# Patient Record
Sex: FEMALE | URBAN - METROPOLITAN AREA
[De-identification: names, ages, dates, MRNs, and addresses within clinical notes are randomized per-mention and may not be internally consistent; named-entity substitution may affect disease eponyms.]

---

## 2017-05-16 ENCOUNTER — APPOINTMENT (RX ONLY)
Dept: URBAN - METROPOLITAN AREA CLINIC 139 | Facility: CLINIC | Age: 37
Setting detail: DERMATOLOGY
End: 2017-05-16

## 2017-05-16 DIAGNOSIS — B36.0 PITYRIASIS VERSICOLOR: ICD-10-CM

## 2017-05-16 DIAGNOSIS — Z80.8 FAMILY HISTORY OF MALIGNANT NEOPLASM OF OTHER ORGANS OR SYSTEMS: ICD-10-CM

## 2017-05-16 PROCEDURE — ? KOH PREP

## 2017-05-16 PROCEDURE — 99202 OFFICE O/P NEW SF 15 MIN: CPT

## 2017-05-16 PROCEDURE — ? PRESCRIPTION

## 2017-05-16 PROCEDURE — ? COUNSELING

## 2017-05-16 RX ORDER — KETOCONAZOLE 20.5 MG/ML
SHAMPOO, SUSPENSION TOPICAL
Qty: 1 | Refills: 5 | Status: ERX | COMMUNITY
Start: 2017-05-16

## 2017-05-16 RX ORDER — FLUCONAZOLE 200 MG/1
TABLET ORAL
Qty: 3 | Refills: 0 | Status: ERX | COMMUNITY
Start: 2017-05-16

## 2017-05-16 RX ADMIN — KETOCONAZOLE: 20.5 SHAMPOO, SUSPENSION TOPICAL at 17:23

## 2017-05-16 RX ADMIN — FLUCONAZOLE: 200 TABLET ORAL at 17:15

## 2017-05-16 ASSESSMENT — LOCATION DETAILED DESCRIPTION DERM: LOCATION DETAILED: RIGHT SUPERIOR MEDIAL MIDBACK

## 2017-05-16 ASSESSMENT — LOCATION ZONE DERM: LOCATION ZONE: TRUNK

## 2017-05-16 ASSESSMENT — LOCATION SIMPLE DESCRIPTION DERM: LOCATION SIMPLE: RIGHT LOWER BACK

## 2017-05-16 NOTE — PROCEDURE: KOH PREP
Add  To Bill: Yes
Detail Level: Zone
Showing: branching hyphae
Koh Intro Text (From The.....): A KOH prep was ordered and evaluated from the

## 2017-05-16 NOTE — HPI: SKIN CHECK
What Is The Reason For Today's Visit?: Excessive sun exposure
Additional History: Patient does not present chief complaints today.

## 2018-03-19 ENCOUNTER — OFFICE VISIT CONVERTED (OUTPATIENT)
Dept: FAMILY MEDICINE CLINIC | Facility: CLINIC | Age: 38
End: 2018-03-19
Attending: NURSE PRACTITIONER

## 2018-03-27 ENCOUNTER — OFFICE VISIT CONVERTED (OUTPATIENT)
Dept: ORTHOPEDIC SURGERY | Facility: CLINIC | Age: 38
End: 2018-03-27
Attending: ORTHOPAEDIC SURGERY

## 2018-04-20 ENCOUNTER — CONVERSION ENCOUNTER (OUTPATIENT)
Dept: ORTHOPEDIC SURGERY | Facility: CLINIC | Age: 38
End: 2018-04-20

## 2018-04-20 ENCOUNTER — OFFICE VISIT CONVERTED (OUTPATIENT)
Dept: ORTHOPEDIC SURGERY | Facility: CLINIC | Age: 38
End: 2018-04-20
Attending: PHYSICIAN ASSISTANT

## 2018-04-26 ENCOUNTER — OFFICE VISIT CONVERTED (OUTPATIENT)
Dept: ORTHOPEDIC SURGERY | Facility: CLINIC | Age: 38
End: 2018-04-26
Attending: PHYSICIAN ASSISTANT

## 2018-04-26 ENCOUNTER — CONVERSION ENCOUNTER (OUTPATIENT)
Dept: ORTHOPEDIC SURGERY | Facility: CLINIC | Age: 38
End: 2018-04-26

## 2018-05-01 ENCOUNTER — OFFICE VISIT CONVERTED (OUTPATIENT)
Dept: ORTHOPEDIC SURGERY | Facility: CLINIC | Age: 38
End: 2018-05-01
Attending: PHYSICIAN ASSISTANT

## 2018-09-10 ENCOUNTER — OFFICE VISIT CONVERTED (OUTPATIENT)
Dept: FAMILY MEDICINE CLINIC | Facility: CLINIC | Age: 38
End: 2018-09-10
Attending: NURSE PRACTITIONER

## 2018-09-10 ENCOUNTER — CONVERSION ENCOUNTER (OUTPATIENT)
Dept: FAMILY MEDICINE CLINIC | Facility: CLINIC | Age: 38
End: 2018-09-10

## 2018-11-01 ENCOUNTER — OFFICE VISIT CONVERTED (OUTPATIENT)
Dept: FAMILY MEDICINE CLINIC | Facility: CLINIC | Age: 38
End: 2018-11-01
Attending: NURSE PRACTITIONER

## 2019-07-08 ENCOUNTER — CONVERSION ENCOUNTER (OUTPATIENT)
Dept: FAMILY MEDICINE CLINIC | Facility: CLINIC | Age: 39
End: 2019-07-08

## 2019-07-08 ENCOUNTER — OFFICE VISIT CONVERTED (OUTPATIENT)
Dept: FAMILY MEDICINE CLINIC | Facility: CLINIC | Age: 39
End: 2019-07-08
Attending: FAMILY MEDICINE

## 2019-07-24 ENCOUNTER — HOSPITAL ENCOUNTER (OUTPATIENT)
Dept: FAMILY MEDICINE CLINIC | Facility: CLINIC | Age: 39
Discharge: HOME | End: 2019-07-24
Attending: NURSE PRACTITIONER

## 2019-07-24 ENCOUNTER — OFFICE VISIT CONVERTED (OUTPATIENT)
Dept: FAMILY MEDICINE CLINIC | Facility: CLINIC | Age: 39
End: 2019-07-24
Attending: NURSE PRACTITIONER

## 2019-07-29 LAB
CONV LAST MENSTURAL PERIOD: NORMAL
SPECIMEN SOURCE: NORMAL
SPECIMEN SOURCE: NORMAL
THIN PREP CVX: NORMAL

## 2019-08-27 ENCOUNTER — HOSPITAL ENCOUNTER (OUTPATIENT)
Dept: URGENT CARE | Facility: CLINIC | Age: 39
Discharge: HOME | End: 2019-08-27

## 2019-09-10 ENCOUNTER — OFFICE VISIT CONVERTED (OUTPATIENT)
Dept: FAMILY MEDICINE CLINIC | Facility: CLINIC | Age: 39
End: 2019-09-10
Attending: NURSE PRACTITIONER

## 2020-01-27 ENCOUNTER — HOSPITAL ENCOUNTER (OUTPATIENT)
Dept: OTHER | Facility: HOSPITAL | Age: 40
Discharge: HOME | End: 2020-01-27
Attending: NURSE PRACTITIONER

## 2020-01-27 LAB
ALBUMIN SERPL-MCNC: 4.2 G/DL (ref 3.5–5)
ALBUMIN/GLOB SERPL: 1.3 {RATIO} (ref 1.4–2.6)
ALP SERPL-CCNC: 59 U/L (ref 42–98)
ALT SERPL-CCNC: 17 U/L (ref 10–40)
ANION GAP SERPL CALC-SCNC: 18 MMOL/L (ref 8–19)
AST SERPL-CCNC: 19 U/L (ref 15–50)
BASOPHILS # BLD AUTO: 0.06 10*3/UL (ref 0–0.2)
BASOPHILS NFR BLD AUTO: 0.9 % (ref 0–3)
BILIRUB SERPL-MCNC: 0.39 MG/DL (ref 0.2–1.3)
BUN SERPL-MCNC: 15 MG/DL (ref 5–25)
BUN/CREAT SERPL: 17 {RATIO} (ref 6–20)
CALCIUM SERPL-MCNC: 8.9 MG/DL (ref 8.7–10.4)
CHLORIDE SERPL-SCNC: 103 MMOL/L (ref 99–111)
CHOLEST SERPL-MCNC: 163 MG/DL (ref 107–200)
CHOLEST/HDLC SERPL: 2.4 {RATIO} (ref 3–6)
CONV ABS IMM GRAN: 0.01 10*3/UL (ref 0–0.2)
CONV CO2: 23 MMOL/L (ref 22–32)
CONV IMMATURE GRAN: 0.2 % (ref 0–1.8)
CONV TOTAL PROTEIN: 7.4 G/DL (ref 6.3–8.2)
CREAT UR-MCNC: 0.89 MG/DL (ref 0.5–0.9)
DEPRECATED RDW RBC AUTO: 43.4 FL (ref 36.4–46.3)
EOSINOPHIL # BLD AUTO: 0.15 10*3/UL (ref 0–0.7)
EOSINOPHIL # BLD AUTO: 2.3 % (ref 0–7)
ERYTHROCYTE [DISTWIDTH] IN BLOOD BY AUTOMATED COUNT: 12.8 % (ref 11.7–14.4)
GFR SERPLBLD BASED ON 1.73 SQ M-ARVRAT: >60 ML/MIN/{1.73_M2}
GLOBULIN UR ELPH-MCNC: 3.2 G/DL (ref 2–3.5)
GLUCOSE SERPL-MCNC: 82 MG/DL (ref 65–99)
HCT VFR BLD AUTO: 39.6 % (ref 37–47)
HDLC SERPL-MCNC: 68 MG/DL (ref 40–60)
HGB BLD-MCNC: 12.9 G/DL (ref 12–16)
LDLC SERPL CALC-MCNC: 87 MG/DL (ref 70–100)
LYMPHOCYTES # BLD AUTO: 2.92 10*3/UL (ref 1–5)
LYMPHOCYTES NFR BLD AUTO: 44.9 % (ref 20–45)
MCH RBC QN AUTO: 30.3 PG (ref 27–31)
MCHC RBC AUTO-ENTMCNC: 32.6 G/DL (ref 33–37)
MCV RBC AUTO: 93 FL (ref 81–99)
MONOCYTES # BLD AUTO: 0.51 10*3/UL (ref 0.2–1.2)
MONOCYTES NFR BLD AUTO: 7.8 % (ref 3–10)
NEUTROPHILS # BLD AUTO: 2.86 10*3/UL (ref 2–8)
NEUTROPHILS NFR BLD AUTO: 43.9 % (ref 30–85)
NRBC CBCN: 0 % (ref 0–0.7)
OSMOLALITY SERPL CALC.SUM OF ELEC: 288 MOSM/KG (ref 273–304)
PLATELET # BLD AUTO: 259 10*3/UL (ref 130–400)
PMV BLD AUTO: 11 FL (ref 9.4–12.3)
POTASSIUM SERPL-SCNC: 4.5 MMOL/L (ref 3.5–5.3)
RBC # BLD AUTO: 4.26 10*6/UL (ref 4.2–5.4)
SODIUM SERPL-SCNC: 139 MMOL/L (ref 135–147)
T4 FREE SERPL-MCNC: 1.3 NG/DL (ref 0.9–1.8)
TRIGL SERPL-MCNC: 41 MG/DL (ref 40–150)
TSH SERPL-ACNC: 2.85 M[IU]/L (ref 0.27–4.2)
VLDLC SERPL-MCNC: 8 MG/DL (ref 5–37)
WBC # BLD AUTO: 6.51 10*3/UL (ref 4.8–10.8)

## 2020-02-24 ENCOUNTER — OFFICE VISIT CONVERTED (OUTPATIENT)
Dept: FAMILY MEDICINE CLINIC | Facility: CLINIC | Age: 40
End: 2020-02-24
Attending: NURSE PRACTITIONER

## 2020-02-24 ENCOUNTER — HOSPITAL ENCOUNTER (OUTPATIENT)
Dept: LAB | Facility: HOSPITAL | Age: 40
Discharge: HOME | End: 2020-02-24
Attending: NURSE PRACTITIONER

## 2020-02-24 LAB
ALBUMIN SERPL-MCNC: 4 G/DL (ref 3.5–5)
ALBUMIN/GLOB SERPL: 1.4 {RATIO} (ref 1.4–2.6)
ALP SERPL-CCNC: 50 U/L (ref 42–98)
ALT SERPL-CCNC: 11 U/L (ref 10–40)
ANION GAP SERPL CALC-SCNC: 17 MMOL/L (ref 8–19)
AST SERPL-CCNC: 14 U/L (ref 15–50)
BASOPHILS # BLD AUTO: 0.06 10*3/UL (ref 0–0.2)
BASOPHILS NFR BLD AUTO: 0.8 % (ref 0–3)
BILIRUB SERPL-MCNC: 0.27 MG/DL (ref 0.2–1.3)
BUN SERPL-MCNC: 12 MG/DL (ref 5–25)
BUN/CREAT SERPL: 13 {RATIO} (ref 6–20)
CALCIUM SERPL-MCNC: 8.9 MG/DL (ref 8.7–10.4)
CHLORIDE SERPL-SCNC: 104 MMOL/L (ref 99–111)
CONV ABS IMM GRAN: 0.01 10*3/UL (ref 0–0.2)
CONV CO2: 24 MMOL/L (ref 22–32)
CONV IMMATURE GRAN: 0.1 % (ref 0–1.8)
CONV TOTAL PROTEIN: 6.9 G/DL (ref 6.3–8.2)
CREAT UR-MCNC: 0.89 MG/DL (ref 0.5–0.9)
CRP SERPL-MCNC: 0.6 MG/L (ref 0–5)
DEPRECATED RDW RBC AUTO: 41.9 FL (ref 36.4–46.3)
EOSINOPHIL # BLD AUTO: 0.08 10*3/UL (ref 0–0.7)
EOSINOPHIL # BLD AUTO: 1 % (ref 0–7)
ERYTHROCYTE [DISTWIDTH] IN BLOOD BY AUTOMATED COUNT: 12.4 % (ref 11.7–14.4)
FOLATE SERPL-MCNC: 19.6 NG/ML (ref 4.8–20)
GFR SERPLBLD BASED ON 1.73 SQ M-ARVRAT: >60 ML/MIN/{1.73_M2}
GLOBULIN UR ELPH-MCNC: 2.9 G/DL (ref 2–3.5)
GLUCOSE SERPL-MCNC: 78 MG/DL (ref 65–99)
HCT VFR BLD AUTO: 39.2 % (ref 37–47)
HGB BLD-MCNC: 12.7 G/DL (ref 12–16)
IRON SATN MFR SERPL: 19 % (ref 20–55)
IRON SERPL-MCNC: 78 UG/DL (ref 60–170)
LYMPHOCYTES # BLD AUTO: 2.67 10*3/UL (ref 1–5)
LYMPHOCYTES NFR BLD AUTO: 34.9 % (ref 20–45)
MCH RBC QN AUTO: 29.9 PG (ref 27–31)
MCHC RBC AUTO-ENTMCNC: 32.4 G/DL (ref 33–37)
MCV RBC AUTO: 92.2 FL (ref 81–99)
MONOCYTES # BLD AUTO: 0.59 10*3/UL (ref 0.2–1.2)
MONOCYTES NFR BLD AUTO: 7.7 % (ref 3–10)
NEUTROPHILS # BLD AUTO: 4.24 10*3/UL (ref 2–8)
NEUTROPHILS NFR BLD AUTO: 55.5 % (ref 30–85)
NRBC CBCN: 0 % (ref 0–0.7)
OSMOLALITY SERPL CALC.SUM OF ELEC: 291 MOSM/KG (ref 273–304)
PLATELET # BLD AUTO: 285 10*3/UL (ref 130–400)
PMV BLD AUTO: 10.6 FL (ref 9.4–12.3)
POTASSIUM SERPL-SCNC: 3.8 MMOL/L (ref 3.5–5.3)
RBC # BLD AUTO: 4.25 10*6/UL (ref 4.2–5.4)
SODIUM SERPL-SCNC: 141 MMOL/L (ref 135–147)
TIBC SERPL-MCNC: 403 UG/DL (ref 245–450)
TRANSFERRIN SERPL-MCNC: 282 MG/DL (ref 250–380)
VIT B12 SERPL-MCNC: 681 PG/ML (ref 211–911)
WBC # BLD AUTO: 7.65 10*3/UL (ref 4.8–10.8)

## 2020-02-25 LAB — B BURGDOR IGG+IGM SER-ACNC: <0.91 ISR (ref 0–0.9)

## 2020-02-26 LAB
R RICKETTSI IGG SER QL IA: NEGATIVE
R RICKETTSI IGM TITR SER: 1 INDEX (ref 0–0.89)

## 2020-02-28 LAB
CONV RHEUMATOID FACTOR IGA: 2 UNITS (ref 0–6)
CONV RHEUMATOID FACTOR IGG: 14 UNITS (ref 0–6)
CONV RHEUMATOID FACTOR IGM: 6 UNITS (ref 0–6)

## 2020-07-16 ENCOUNTER — CONVERSION ENCOUNTER (OUTPATIENT)
Dept: FAMILY MEDICINE CLINIC | Facility: CLINIC | Age: 40
End: 2020-07-16

## 2020-07-16 ENCOUNTER — OFFICE VISIT CONVERTED (OUTPATIENT)
Dept: FAMILY MEDICINE CLINIC | Facility: CLINIC | Age: 40
End: 2020-07-16
Attending: NURSE PRACTITIONER

## 2020-10-28 ENCOUNTER — TELEMEDICINE CONVERTED (OUTPATIENT)
Dept: FAMILY MEDICINE CLINIC | Facility: CLINIC | Age: 40
End: 2020-10-28
Attending: NURSE PRACTITIONER

## 2021-03-31 ENCOUNTER — OFFICE VISIT CONVERTED (OUTPATIENT)
Dept: FAMILY MEDICINE CLINIC | Facility: CLINIC | Age: 41
End: 2021-03-31
Attending: NURSE PRACTITIONER

## 2021-04-20 ENCOUNTER — OFFICE VISIT CONVERTED (OUTPATIENT)
Dept: FAMILY MEDICINE CLINIC | Facility: CLINIC | Age: 41
End: 2021-04-20
Attending: NURSE PRACTITIONER

## 2021-04-20 LAB
BILIRUB UR QL STRIP: NEGATIVE
COLOR UR: NORMAL
CONV CLARITY OF URINE: CLEAR
CONV PROTEIN IN URINE BY AUTOMATED TEST STRIP: NEGATIVE
CONV UROBILINOGEN IN URINE BY AUTOMATED TEST STRIP: NORMAL
GLUCOSE UR QL: NEGATIVE
HGB UR QL STRIP: NEGATIVE
KETONES UR QL STRIP: NEGATIVE
LEUKOCYTE ESTERASE UR QL STRIP: NEGATIVE
NITRITE UR QL STRIP: NEGATIVE
PH UR STRIP.AUTO: 7 [PH]
SP GR UR: 1.02

## 2021-04-21 ENCOUNTER — HOSPITAL ENCOUNTER (OUTPATIENT)
Dept: FAMILY MEDICINE CLINIC | Facility: CLINIC | Age: 41
Discharge: HOME | End: 2021-04-21
Attending: NURSE PRACTITIONER

## 2021-05-13 NOTE — PROGRESS NOTES
Progress Note      Patient Name: Zaria Santacruz   Patient ID: [id number]   Sex: Female   YOB: 1980    Primary Care Provider: Joanna FERRER   Referring Provider: Joanna FERRER    Visit Date: 2020    Provider: NABIL Remy   Location: [facility]   Location Address: [address] [place] [address], [address]  [place], [place]  [id number]   Location Phone: [phone]          Chief Complaint  · physical      History Of Present Illness  Zaria Santacruz is a 40 year old /White female who presents for evaluation and treatment of:      Pt is here for physical, she is doing well, no issues at this time. She is due for a Mammogram and would like to be scheduled.    She is needing the PE for annual foster care certification.       Past Medical History  Disease Name Date Onset Notes   Aftercare following excision of Left anterolateral ankle cyst 2018 --    Aftercare following left ankle cyst excision 2018 --    Mass of ankle, left 2018 --    Pap smear for cervical cancer screening 19- [facility]-   Screening Mammogram --  --    Vein disorder --  --          Past Surgical History  Procedure Name Date Notes   ACL Reconstruction  Lt Knee   Colonoscopy --  never   Cyst Removal  Cyst removal Rt Ankle   Gastric Bypass  --    Hysterectomy  partial         Allergy List  Allergen Name Date Reaction Notes   hydrocodone-acetaminophen --  --  --    hydrocodone-ibuprofen --  --  --          Family Medical History  Disease Name Relative/Age Notes   Stroke Father/   Father   Heart Disease Grandfather (maternal)/  Grandfather (paternal)/   --    Diabetes Grandfather (maternal)/   --    Skin Carcinoma Grandmother (maternal)/   --          Reproductive History  Menstrual   Age Menarche: 10   Pregnancy Summary   Total Pregnancies: 4 Full Term: 4 Premature: 0   Ab Induced: 0 Ab Spontaneous: 0 Ectopics: 0   Multiples: 0 Livin         Social  "History  Finding Status Start/Stop Quantity Notes   Alcohol Current some day --/-- Occasionally 04/20/2018 -    Alcohol Use Current some day --/-- --  occasionally drinks, less than 1 drink per day, has been drinking for 11-20 years   lives with children --  --/-- --  --    lives with spouse --  --/-- --  --     --  --/-- --  --    . --  --/-- --  --    Recreational Drug Use Never --/-- --  no   Tobacco Former 15/32 .5 ppd --    Working --  --/-- --  --          Immunizations  NameDate Admin Mfg Trade Name Lot Number Route Inj VIS Given VIS Publication   InfluenzaRefused 09/10/2019 NE Not Entered  NE NE     Comments: Patient refused   InfluenzaRefused 07/24/2019 NE Not Entered  NE NE     Comments: pt refused   Tdap01/01/2010 NE Not Entered  NE NE     Comments: Pt reported         Review of Systems  · Constitutional  o Denies  o : fever, fatigue, weight loss, weight gain  · Breasts  o Denies  o : lumps, tenderness, swelling, nipple discharge  · Cardiovascular  o Denies  o : lower extremity edema, claudication, chest pressure, palpitations  · Respiratory  o Denies  o : shortness of breath, wheezing, cough, hemoptysis, dyspnea on exertion  · Gastrointestinal  o Denies  o : nausea, vomiting, diarrhea, constipation, abdominal pain      Vitals  Date Time BP Position Site L\R Cuff Size HR RR TEMP (F) WT  HT  BMI kg/m2 BSA m2 O2 Sat        09/10/2019 02:55 /70 Sitting    66 - R 12 97.9 192lbs 4oz 5'  5\" 31.99 2 100 %    02/24/2020 11:41 /82 Sitting    70 - R   202lbs 6oz 5'  5\" 33.68 2.05 98 %    07/16/2020 02:03 /88 Sitting    64 - R   204lbs 6oz 5'  5\" 34.01 2.06 97 %          Physical Examination  · Constitutional  o Appearance  o : well-nourished, well developed, alert, in no acute distress  · Ears, Nose, Mouth and Throat  o Ears  o :   § External Ears  § : appearance within normal limits, no lesions present  § Otoscopic Examination  § : tympanic membrane appearance within normal limits " bilaterally without perforations, mobility normal  o Nose  o :   § External Nose  § : normal stucture noted.  § Intranasal Exam  § : no swelling, reddness, turbs normal patrick.  o Oral Cavity  o :   § Oral Mucosa  § : oral mucosa normal without pallor or cyanosis  § Lips  § : lip appearance normal  § Teeth  § : normal dentition for age  § Gums  § : gums pink, non-swollen, no bleeding present  § Tongue  § : tongue appearance normal  § Palate  § : hard palate normal, soft palate appearance normal  o Throat  o :   § Oropharynx  § : no inflammation or lesions present, tonsils within normal limits  · Respiratory  o Respiratory Effort  o : breathing unlabored  o Auscultation of Lungs  o : normal breath sounds throughout  · Cardiovascular  o Heart  o :   § Auscultation of Heart  § : regular rate and rhythm, no murmurs, gallops or rubs  § Palpation of Heart  § : normal apical impulse, no cardiac thrill present  o Peripheral Vascular System  o :   § Extremities  § : no cyanosis, clubbing or edema; less than 2 second refill noted  · Gastrointestinal  o Abdominal Examination  o : abdomen nontender to palpation, tone normal without rigidity or guarding, no masses present, abdominal contour scaphoid  o Liver and spleen  o : no hepatomegaly present, liver nontender to palpation, spleen not palpable  · Skin and Subcutaneous Tissue  o General Inspection  o : no rashes or lesions present, no areas of discoloration  · Neurologic  o Mental Status Examination  o :   § Orientation  § : grossly oriented to person, place and time  o Cranial Nerves  o : cranial nerves intact and symmetric throughout  · Psychiatric  o Mood and Affect  o : mood normal, affect appropriate          Assessment  · Annual physical exam     V70.0/Z00.00  · Visit for screening mammogram     V76.12/Z12.31    Problems Reconciled  Plan  · Orders  o Screening Mammography; Bilateral 3D (78936, , 46621) - V76.12/Z12.31 - 10/14/2020   NEYMAR 10/14/2020 4pm  o ACO-39:  Current medications updated and reviewed () - - 07/16/2020  · Medications  o Medications have been Reconciled  o Transition of Care or Provider Policy  · Instructions  o Reviewed health maintenance flowsheet and updated information. Orders were placed and/or patient's response was documented.  o Patient was educated/instructed on their diagnosis, treatment and medications prior to discharge from the clinic today.  o Patient instructed to seek medical attention urgently for new or worsening symptoms.  o Call the office with any concerns or questions.            Electronically Signed by: NABIL Remy -Author on July 16, 2020 02:12:18 PM

## 2021-05-13 NOTE — PROGRESS NOTES
Progress Note      Patient Name: Zaria Santacruz   Patient ID: [id number]   Sex: Female   YOB: 1980    Primary Care Provider: Joanna FERRER   Referring Provider: Joanna FERRER    Visit Date: October 28, 2020    Provider: NABIL Remy   Location: [facility]   Location Address: [address] [place] [address], [address]  [place], [place]  [id number]   Location Phone: [phone]          Chief Complaint  · follow up      History Of Present Illness  Zaria Santacruz is a 40 year old /White female who presents for evaluation and treatment of:   Video Conferencing Visit  Zaria Santacruz is a 40 year old /White female who is presenting for evaluation via video conferencing via [employer]. Verbal consent obtained before beginning visit.   The following staff were present during this visit: Natacha Etienne MA      Pt has been seen and tested at [facility] and was told all her labs came back normal, she has been referred back to her PCP. We will try to obtain records from office.    PT reports she is still having lots of joint and all over body pain and is starting to have muscle weakness as well.    She missed her mammogram appt and needs to reschedule.    Pt has not received her flu vaccine yet.         Past Medical History  Disease Name Date Onset Notes   Aftercare following excision of Left anterolateral ankle cyst 04/11/2018 04/23/2018 --    Aftercare following left ankle cyst excision 04/27/2018 --    Mass of ankle, left 03/29/2018 --    Pap smear for cervical cancer screening 7/24/19- [facility]-2015   Screening Mammogram --  --    Vein disorder --  --          Past Surgical History  Procedure Name Date Notes   ACL Reconstruction 1999 Lt Knee   Colonoscopy --  never   Cyst Removal 1998 Cyst removal Rt Ankle   Gastric Bypass 2013 --    Hysterectomy 2015 partial         Allergy List  Allergen Name Date Reaction Notes   hydrocodone-acetaminophen --  --  --     hydrocodone-ibuprofen --  --  --          Family Medical History  Disease Name Relative/Age Notes   Stroke Father/   Father   Heart Disease Grandfather (maternal)/  Grandfather (paternal)/   --    Diabetes Grandfather (maternal)/   --    Skin Carcinoma Grandmother (maternal)/   --          Reproductive History  Menstrual   Age Menarche: 10   Pregnancy Summary   Total Pregnancies: 4 Full Term: 4 Premature: 0   Ab Induced: 0 Ab Spontaneous: 0 Ectopics: 0   Multiples: 0 Livin         Social History  Finding Status Start/Stop Quantity Notes   Alcohol Current some day --/-- Occasionally 2018 -    Alcohol Use Current some day --/-- --  occasionally drinks, less than 1 drink per day, has been drinking for 11-20 years   lives with children --  --/-- --  --    lives with spouse --  --/-- --  --     --  --/-- --  --    . --  --/-- --  --    Recreational Drug Use Never --/-- --  no   Tobacco Former 15/32 . ppd --    Working --  --/-- --  --          Immunizations  NameDate Admin Mfg Trade Name Lot Number Route Inj VIS Given VIS Publication   InfluenzaRefused 09/10/2019 NE Not Entered  NE NE     Comments: Patient refused   Tdap2010 NE Not Entered  NE NE     Comments: Pt reported         Review of Systems  · Constitutional  o Denies  o : fever, fatigue, weight loss, weight gain  · Cardiovascular  o Denies  o : lower extremity edema, claudication, chest pressure, palpitations  · Respiratory  o Denies  o : shortness of breath, wheezing, cough, hemoptysis, dyspnea on exertion  · Gastrointestinal  o Denies  o : nausea, vomiting, diarrhea, constipation, abdominal pain  · Musculoskeletal  o Admits  o : joint pain, muscle pain, muscular weakness      Physical Examination  · Constitutional  o Appearance  o : well-nourished, well developed, alert, in no acute distress  · Neurologic  o Mental Status Examination  o :   § Orientation  § : grossly oriented to person, place and time  · Psychiatric  o Mood and  Affect  o : mood normal, affect appropriate, denies any SI/HI          Assessment  · Muscle weakness     728.87/M62.81  · Joint pain     719.40/M25.50    Problems Reconciled  Plan  · Orders  o ACO-39: Current medications updated and reviewed (1159F, ) - - 10/28/2020  o ACO-14: Influenza immunization was not administered for reasons documented [facility] () - - 10/28/2020  · Medications  o Medications have been Reconciled  o Transition of Care or Provider Policy  · Instructions  o Patient was educated/instructed on their diagnosis, treatment and medications prior to discharge from the clinic today.  o Call the office with any concerns or questions.  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  · Disposition  o Call or Return if symptoms worsen or persist.            Electronically Signed by: NABIL Remy -Author on October 28, 2020 08:30:08 AM

## 2021-05-14 VITALS
BODY MASS INDEX: 3.33 KG/M2 | SYSTOLIC BLOOD PRESSURE: 125 MMHG | HEIGHT: 65 IN | DIASTOLIC BLOOD PRESSURE: 72 MMHG | WEIGHT: 20 LBS | OXYGEN SATURATION: 99 % | HEART RATE: 61 BPM

## 2021-05-14 VITALS
HEART RATE: 72 BPM | BODY MASS INDEX: 34.57 KG/M2 | DIASTOLIC BLOOD PRESSURE: 74 MMHG | WEIGHT: 207.5 LBS | OXYGEN SATURATION: 97 % | SYSTOLIC BLOOD PRESSURE: 136 MMHG | HEIGHT: 65 IN

## 2021-05-14 NOTE — PROGRESS NOTES
Progress Note      Patient Name: Zaria Santacruz   Patient ID: [id number]   Sex: Female   YOB: 1980    Primary Care Provider: Joanna FERRER   Referring Provider: Joanna FERRER    Visit Date: April 20, 2021    Provider: NABIL Remy   Location: [facility]   Location Address: [address] [place] [address], [address]  [place]  [id number]   Location Phone: [phone]          Chief Complaint  · Annual Exam  · PAP exam  · (Health Maintainence Information Reviewed Under Results)      History Of Present Illness  Last PAP Smear: 7/24/19.   Date of Last Mammogram: unknown.   Date of Last Colonoscopy: N/A   No current complaints.   Zaria Santacruz is a 40 year old /White female who presents for evaluation and treatment of:      Pt is here for a pap. LMP was many years ago. No issues or concerns at this time.    Pt is scheduled for mammogram.    Pt has lab order to get done.       Past Medical History  Disease Name Date Onset Notes   Aftercare following excision of Left anterolateral ankle cyst 04/11/2018 04/23/2018 --    Aftercare following left ankle cyst excision 04/27/2018 --    Mass of ankle, left 03/29/2018 --    Pap smear for cervical cancer screening 7/24/19- [facility]-2015   Screening Mammogram --  --    Vein disorder --  --          Past Surgical History  Procedure Name Date Notes   ACL Reconstruction 1999 Lt Knee   Colonoscopy --  never   Cyst Removal 1998 Cyst removal Rt Ankle   Gastric Bypass 2013 --    Hysterectomy 2015 partial         Allergy List  Allergen Name Date Reaction Notes   hydrocodone-acetaminophen --  --  --    hydrocodone-ibuprofen --  --  --          Family Medical History  Disease Name Relative/Age Notes   Stroke Father/   Father   Heart Disease Grandfather (maternal)/  Grandfather (paternal)/   --    Diabetes Grandfather (maternal)/   --    Skin Carcinoma Grandmother (maternal)/   --          Reproductive History  Menstrual   Age  Menarche: 10   Pregnancy Summary   Total Pregnancies: 4 Full Term: 4 Premature: 0   Ab Induced: 0 Ab Spontaneous: 0 Ectopics: 0   Multiples: 0 Livin         Social History  Finding Status Start/Stop Quantity Notes   Alcohol Current some day --/-- Occasionally 2018 -    Alcohol Use Current some day --/-- --  occasionally drinks, less than 1 drink per day, has been drinking for 11-20 years   lives with children --  --/-- --  --    lives with spouse --  --/-- --  --     --  --/-- --  --    . --  --/-- --  --    Recreational Drug Use Never --/-- --  no   Tobacco Former 15/32 .5 ppd --    Working --  --/-- --  --          Immunizations  NameDate Admin Mfg Trade Name Lot Number Route Inj VIS Given VIS Publication   InfluenzaRefused 2021 NE Not Entered  NE NE     Comments: declined   Tdap2010 NE Not Entered  NE NE     Comments: Pt reported         Review of Systems  · Constitutional  o Denies  o : appetite change, fatigue, night sweats, weight gain, weight loss  · HENT  o Denies  o : hearing loss, tinnitus, vertigo, nasal discharge, nose bleeding, dental problems, oral lesions, sore throat  · Breasts  o Denies  o : lumps, tenderness, swelling, nipple discharge  · Cardiovascular  o Denies  o : chest pain, decreased exercise tolerance, dyspnea on exertion, palpitations  · Respiratory  o Denies  o : cough, shortness of breath, wheezing, snoring, apneas  · Gastrointestinal  o Denies  o : abdominal pain, nausea, vomiting, dysphagia, heartburn, changes in bowel habits, constipation, diarrhea  · Genitourinary  o Denies  o : dysuria, hematuria, incontinence, nocturia, frequency, urgency, sexual dysfunction  · Neurologic  o Denies  o : abnormal gait, facial weakness, headache, memory difficulties, tingling or numbness, seizures, tremors  · Psychiatric  o Denies  o : anxiety, decreased concentration, irritability, panic attacks, sleep distrubances, sadness/tearfulness      Vitals  Date Time BP  "Position Site L\R Cuff Size HR RR TEMP (F) WT  HT  BMI kg/m2 BSA m2 O2 Sat FR L/min FiO2 HC       07/16/2020 02:03 /88 Sitting    64 - R   204lbs 6oz 5'  5\" 34.01 2.06 97 %      03/31/2021 02:54 /74 Sitting    72 - R   207lbs 8oz 5'  5\" 34.53 2.08 97 %      04/20/2021 03:25 /72 Sitting    61 - R   20lbs 0oz 5'  5\" 3.33 0.65 99 %  21%          Physical Examination  · Constitutional  o Appearance  o : well-nourished, in no acute distress  · Ears, Nose, Mouth and Throat  o Ears  o :   § External Ears  § : appearance within normal limits, no lesions present  § Otoscopic Examination  § : tympanic membrane appearance within normal limits bilaterally without perforations, mobility normal  o Nose  o :   § External Nose  § : normal stucture noted.  § Intranasal Exam  § : no swelling, reddness, turbs normal patrick.  o Oral Cavity  o :   § Oral Mucosa  § : oral mucosa normal without pallor or cyanosis  § Lips  § : lip appearance normal  § Teeth  § : normal dentition for age  § Gums  § : gums pink, non-swollen, no bleeding present  § Tongue  § : tongue appearance normal  § Palate  § : hard palate normal, soft palate appearance normal  o Throat  o :   § Oropharynx  § : no inflammation or lesions present, tonsils within normal limits  · Neck  o Inspection/Palpation  o : normal appearance, no masses or tenderness, trachea midline  o Thyroid  o : gland size normal, nontender, no nodules or masses present on palpation  · Respiratory  o Respiratory Effort  o : breathing unlabored  o Inspection of Chest  o : normal appearance  o Auscultation of Lungs  o : normal breath sounds throughout  · Cardiovascular  o Heart  o :   § Auscultation of Heart  § : regular rate and rhythm, no murmurs, gallops or rubs  § Palpation of Heart  § : normal apical impulse, no cardiac thrill present  o Peripheral Vascular System  o :   § Carotid Arteries  § : normal pulses bilaterally, no bruits present  § Pedal Pulses  § : pulses 2 " bilaterally  § Extremities  § : no cyanosis, clubbing or edema; less than 2 second refill noted  · Breasts  o Inspection of Breasts  o : breasts symmetrical, no skin changes, no deformities present, no discharge present  o Palpation of Breasts, Axillae  o : no masses present on palpation, no breast tenderness  · Gastrointestinal  o Abdominal Examination  o : abdomen nontender to palpation, tone normal without rigidity or guarding, no masses present, normal bowel sounds  o Liver and spleen  o : no hepatomegaly present, liver nontender to palpation, spleen not palpable  · Genitourinary  o External Genitalia  o : no inflammation, no lesions present  o Vagina  o : normal vaginal vault, no discharge present, no inflammatory lesions present, no masses present  o Bladder  o : nontender to palpation  o Cervix  o : appearance healthy, no lesions present, nontender to palpation, no discharges, no bleeding present, normal midline position  o Uterus  o : nontender to palpation, no masses present, position midline/midplane  o Adnexa  o : no tenderness or masses present on bimanual examination  o Anus  o : no inflammation or lesions present  o Perineum  o : perineum within normal limits  · Lymphatic  o Neck  o : no lymphadenopathy present  o Axilla  o : no lymphadenopathy present  o Groin  o : no lymphadenopathy present  · Musculoskeletal  o Right Upper Extremity  o :   § Inspection/Palpation  § : no tenderness to palpation  § Range of Motion  § : range of motion normal, no joint crepitus or pain with motion present  o Left Upper Extremity  o :   § Inspection/Palpation  § : no tenderness to palpation  § Range of Motion  § : range of motion normal, no joint crepitus present, no pain with joint motion  o Right Lower Extremity  o :   § Inspection/Palpation  § : no joint or limb tenderness to palpation  § Range of Motion  § : range of motion normal, no joint crepitations present, no pain on motion  o Left Lower Extremity  o :    § Inspection/Palpation  § : no joint or limb tenderness to palpation  § Range of Motion  § : range of motion normal, no joint crepitations present, no pain on motion  · Skin and Subcutaneous Tissue  o General Inspection  o : no rashes or lesions present, no areas of discoloration  · Neurologic  o Mental Status Examination  o :   § Orientation  § : grossly oriented to person, place and time  o Cranial Nerves  o : cranial nerves intact and symmetric throughout  o Gait and Station  o : normal gait, able to stand without difficulty  · Psychiatric  o Judgment and Insight  o : judgment and insight intact  o Mood and Affect  o : mood normal, affect appropriate          Results  · In-Office Procedures  o Lab procedure  § IOP - Urinalysis without Microscopy (Clinitek) [facility] ([id number])   § Color Ur: Lt. Yellow   § Clarity Ur: Clear   § Glucose Ur Ql Strip: Negative   § Bilirub Ur Ql Strip: Negative   § Ketones Ur Ql Strip: Negative   § Sp Gr Ur Qn: 1.020   § Hgb Ur Ql Strip: Negative   § pH Ur-LsCnc: 7.0   § Prot Ur Ql Strip: Negative   § Urobilinogen Ur Strip-mCnc: 0.2 E.U./dL   § Nitrite Ur Ql Strip: Negative   § WBC Est Ur Ql Strip: Negative       Assessment  · Routine gynecological examination     V72.31/Z01.419  · Pap smear, as part of routine gynecological examination     V76.2/Z01.419      Plan  · Orders  o Pap smear (98798) - V76.2/Z01.419 - 04/20/2021  o ACO-39: Current medications updated and reviewed (, 1159F) - - 04/20/2021  · Medications  o Medications have been Reconciled  o Transition of Care or Provider Policy  · Instructions  o **Pap Test/Liquid Based:   o Thin Prep  o Source:  o Cervix  o ********  o **Perform Reflex Human Papilloma Virus (HPV) High Risk on this Pap (If atypical squamous cells of the undetermined signifigcance (ASCUS)/Atypical Glandular Cells of undetermined significance (AGCUS): Low Grade Squamous Intraepitheal lesion (LGSIL): **  o Yes, if abnormal    o No  o ********  o Medicare:  o No  o **Is this an annual PAP:  o Yes  o Counseled on monthly breast self exams.   o Counseled on diet and exercise.   o Counseled on weight-bearing exercise.  o Used cytobrush to obtain Pap smear specimen. Sent to pathology for testing and review.  o Patient was educated/instructed on their diagnosis, treatment and medications prior to discharge from the clinic today.  o Patient instructed to seek medical attention urgently for new or worsening symptoms.  o Call the office with any concerns or questions.  · Disposition  o Call or Return if symptoms worsen or persist.            Electronically Signed by: NABIL Remy -Author on April 20, 2021 03:47:02 PM

## 2021-05-14 NOTE — PROGRESS NOTES
Progress Note      Patient Name: Zaria Santacruz   Patient ID: [id number]   Sex: Female   YOB: 1980    Primary Care Provider: Joanna FERRER   Referring Provider: Joanna FERRER    Visit Date: March 31, 2021    Provider: NABIL Remy   Location: [facility]   Location Address: [address] [place] [address], [address]  [place]  [id number]   Location Phone: [phone]          Chief Complaint  · right arm pain      History Of Present Illness  Zaria Santacruz is a 40 year old /White female who presents for evaluation and treatment of:      Pt is here for right arm pain that is sharp that radiates down to her hand and has warmness, tingling, numb feeling that has been going on for the last year and half, but is getting worse in the last year. She works at the computer all day and wonders if it may be related.  She has no neck pain.     Pt is due for labs, mammogram, and pap which she would like to schedule.    Flu declined       Past Medical History  Disease Name Date Onset Notes   Aftercare following excision of Left anterolateral ankle cyst 04/11/2018 04/23/2018 --    Aftercare following left ankle cyst excision 04/27/2018 --    Mass of ankle, left 03/29/2018 --    Pap smear for cervical cancer screening 7/24/19- [facility]-2015   Screening Mammogram --  --    Vein disorder --  --          Past Surgical History  Procedure Name Date Notes   ACL Reconstruction 1999 Lt Knee   Colonoscopy --  never   Cyst Removal 1998 Cyst removal Rt Ankle   Gastric Bypass 2013 --    Hysterectomy 2015 partial         Allergy List  Allergen Name Date Reaction Notes   hydrocodone-acetaminophen --  --  --    hydrocodone-ibuprofen --  --  --          Family Medical History  Disease Name Relative/Age Notes   Stroke Father/   Father   Heart Disease Grandfather (maternal)/  Grandfather (paternal)/   --    Diabetes Grandfather (maternal)/   --    Skin Carcinoma Grandmother (maternal)/   --   "        Reproductive History  Menstrual   Age Menarche: 10   Pregnancy Summary   Total Pregnancies: 4 Full Term: 4 Premature: 0   Ab Induced: 0 Ab Spontaneous: 0 Ectopics: 0   Multiples: 0 Livin         Social History  Finding Status Start/Stop Quantity Notes   Alcohol Current some day --/-- Occasionally 2018 -    Alcohol Use Current some day --/-- --  occasionally drinks, less than 1 drink per day, has been drinking for 11-20 years   lives with children --  --/-- --  --    lives with spouse --  --/-- --  --     --  --/-- --  --    . --  --/-- --  --    Recreational Drug Use Never --/-- --  no   Tobacco Former 15/32 . ppd --    Working --  --/-- --  --          Immunizations  NameDate Admin Mfg Trade Name Lot Number Route Inj VIS Given VIS Publication   InfluenzaRefused 2021 NE Not Entered  NE NE     Comments: declined   Tdap2010 NE Not Entered  NE NE     Comments: Pt reported         Review of Systems  · Constitutional  o Denies  o : fever, fatigue, weight loss, weight gain  · Breasts  o Denies  o : lumps, tenderness, swelling, nipple discharge  · Cardiovascular  o Denies  o : lower extremity edema, claudication, chest pressure, palpitations  · Respiratory  o Denies  o : shortness of breath, wheezing, cough, hemoptysis, dyspnea on exertion  · Gastrointestinal  o Denies  o : nausea, vomiting, diarrhea, constipation, abdominal pain  · Neurologic  o Admits  o : tingling or numbness RUE  · Musculoskeletal  o Admits  o : joint pain, right arm pain with N/T      Vitals  Date Time BP Position Site L\R Cuff Size HR RR TEMP (F) WT  HT  BMI kg/m2 BSA m2 O2 Sat FR L/min FiO2 HC       2021 02:54 /74 Sitting    72 - R   207lbs 8oz 5'  5\" 34.53 2.08 97 %            Physical Examination  · Constitutional  o Appearance  o : well-nourished, well developed, alert, in no acute distress  · Neck  o Inspection/Palpation  o : normal appearance, no masses or tenderness, trachea " midline  o Range of Motion  o : cervical range of motion within normal limits  · Respiratory  o Respiratory Effort  o : breathing unlabored  o Auscultation of Lungs  o : normal breath sounds throughout  · Cardiovascular  o Heart  o :   § Auscultation of Heart  § : regular rate and rhythm, no murmurs, gallops or rubs  § Palpation of Heart  § : normal apical impulse, no cardiac thrill present  o Peripheral Vascular System  o :   § Extremities  § : no cyanosis, clubbing or edema; less than 2 second refill noted  · Gastrointestinal  o Abdominal Examination  o : abdomen nontender to palpation, tone normal without rigidity or guarding, no masses present, abdominal contour scaphoid  o Liver and spleen  o : no hepatomegaly present, liver nontender to palpation, spleen not palpable  · Musculoskeletal  o Right Upper Extremity  o :   § Inspection/Palpation  § : no tenderness to palpation  § Range of Motion  § : range of motion normal, no joint crepitus or pain with motion present  o Left Upper Extremity  o :   § Inspection/Palpation  § : no tenderness to palpation  § Range of Motion  § : range of motion normal, no joint crepitus present, no pain with joint motion  · Skin and Subcutaneous Tissue  o General Inspection  o : no rashes or lesions present, no areas of discoloration  · Neurologic  o Mental Status Examination  o :   § Orientation  § : grossly oriented to person, place and time  o Cranial Nerves  o : cranial nerves intact and symmetric throughout  · Psychiatric  o Mood and Affect  o : mood normal, affect appropriate, denies any SI/HI          Assessment  · Screening for depression     V79.0/Z13.89  · Screening for lipid disorders     V77.91/Z13.220  · Visit for screening mammogram     V76.12/Z12.31  · Right arm pain     729.5/M79.601  · Routine lab draw     V72.60/Z01.89  · Screening for thyroid disorder     V77.0/Z13.29  · Numbness and tingling of upper extremity       Anesthesia of skin     782.0/R20.0  Paresthesia  of skin     782.0/R20.2  right arm and hand    Problems Reconciled  Plan  · Orders  o ACO-18: Negative screen for clinical depression using a standardized tool () - V79.0/Z13.89 - 03/31/2021  o Screening Mammography; Bilateral 3D (65643, 40886, ) - V76.12/Z12.31 - 04/15/2021   [facility] 4/15/2021 at 330pm  o CBC with Auto Diff [facility] (06539) - V72.60/Z01.89 - 03/31/2021  o CMP [facility] (63926) - V72.60/Z01.89 - 03/31/2021  o Lipid Panel [facility] (19810) - V77.91/Z13.220 - 03/31/2021  o Thyroid Profile (22108, 80766, THYII) - V77.0/Z13.29 - 03/31/2021  o ACO-18: Negative screen for clinical depression using a standardized tool () - - 03/31/2021  o ACO-14: Influenza immunization was not administered for reasons documented [facility] () - - 03/31/2021  o ACO-39: Current medications updated and reviewed (1159F, ) - - 03/31/2021  o Nerve conduction studies; 1-2 studies (46334) - - 03/31/2021   RUE  · Medications  o Medications have been Reconciled  o Transition of Care or Provider Policy  · Instructions  o Depression Screen completed and scanned into the EMR under the designated folder within the patient's documents.  o Today's PHQ-9 result is _4__  o The provider screening met the required time of 15 minutes.  o Patient was educated/instructed on their diagnosis, treatment and medications prior to discharge from the clinic today.  o Patient instructed to seek medical attention urgently for new or worsening symptoms.  o Call the office with any concerns or questions.  · Disposition  o Call or Return if symptoms worsen or persist.            Electronically Signed by: NABIL Remy -Author on March 31, 2021 03:12:21 PM

## 2021-05-15 VITALS
WEIGHT: 202.37 LBS | SYSTOLIC BLOOD PRESSURE: 126 MMHG | OXYGEN SATURATION: 98 % | DIASTOLIC BLOOD PRESSURE: 82 MMHG | HEIGHT: 65 IN | BODY MASS INDEX: 33.72 KG/M2 | HEART RATE: 70 BPM

## 2021-05-15 VITALS
WEIGHT: 204.37 LBS | SYSTOLIC BLOOD PRESSURE: 125 MMHG | HEIGHT: 65 IN | OXYGEN SATURATION: 97 % | DIASTOLIC BLOOD PRESSURE: 88 MMHG | HEART RATE: 64 BPM | BODY MASS INDEX: 34.05 KG/M2

## 2021-05-15 VITALS
SYSTOLIC BLOOD PRESSURE: 87 MMHG | HEIGHT: 65 IN | TEMPERATURE: 98.1 F | HEART RATE: 72 BPM | DIASTOLIC BLOOD PRESSURE: 62 MMHG | SYSTOLIC BLOOD PRESSURE: 97 MMHG | BODY MASS INDEX: 30.82 KG/M2 | OXYGEN SATURATION: 96 % | DIASTOLIC BLOOD PRESSURE: 50 MMHG | WEIGHT: 185 LBS

## 2021-05-15 VITALS
DIASTOLIC BLOOD PRESSURE: 70 MMHG | TEMPERATURE: 97.9 F | HEART RATE: 66 BPM | OXYGEN SATURATION: 100 % | HEIGHT: 65 IN | BODY MASS INDEX: 32.03 KG/M2 | WEIGHT: 192.25 LBS | RESPIRATION RATE: 12 BRPM | SYSTOLIC BLOOD PRESSURE: 109 MMHG

## 2021-05-15 VITALS
WEIGHT: 189.37 LBS | SYSTOLIC BLOOD PRESSURE: 112 MMHG | HEIGHT: 65 IN | OXYGEN SATURATION: 100 % | BODY MASS INDEX: 31.55 KG/M2 | DIASTOLIC BLOOD PRESSURE: 78 MMHG | HEART RATE: 50 BPM

## 2021-05-16 VITALS
WEIGHT: 196 LBS | DIASTOLIC BLOOD PRESSURE: 68 MMHG | BODY MASS INDEX: 32.65 KG/M2 | HEIGHT: 65 IN | HEART RATE: 52 BPM | SYSTOLIC BLOOD PRESSURE: 101 MMHG

## 2021-05-16 VITALS
HEART RATE: 63 BPM | HEIGHT: 65 IN | DIASTOLIC BLOOD PRESSURE: 67 MMHG | SYSTOLIC BLOOD PRESSURE: 102 MMHG | BODY MASS INDEX: 33.22 KG/M2 | WEIGHT: 199.37 LBS

## 2021-05-16 VITALS — BODY MASS INDEX: 32.51 KG/M2 | OXYGEN SATURATION: 98 % | HEIGHT: 65 IN | WEIGHT: 195.12 LBS | HEART RATE: 77 BPM

## 2021-05-16 VITALS — WEIGHT: 195 LBS | OXYGEN SATURATION: 98 % | BODY MASS INDEX: 32.49 KG/M2 | HEART RATE: 69 BPM | HEIGHT: 65 IN

## 2021-05-16 VITALS
HEART RATE: 62 BPM | WEIGHT: 196.12 LBS | TEMPERATURE: 98.3 F | DIASTOLIC BLOOD PRESSURE: 72 MMHG | BODY MASS INDEX: 32.68 KG/M2 | HEIGHT: 65 IN | OXYGEN SATURATION: 98 % | SYSTOLIC BLOOD PRESSURE: 107 MMHG

## 2021-05-16 VITALS — WEIGHT: 196.12 LBS | HEART RATE: 72 BPM | BODY MASS INDEX: 32.68 KG/M2 | HEIGHT: 65 IN | OXYGEN SATURATION: 98 %

## 2021-05-16 VITALS — HEART RATE: 80 BPM | OXYGEN SATURATION: 98 % | HEIGHT: 65 IN

## 2021-05-22 ENCOUNTER — TRANSCRIBE ORDERS (OUTPATIENT)
Dept: ADMINISTRATIVE | Facility: HOSPITAL | Age: 41
End: 2021-05-22

## 2021-05-22 DIAGNOSIS — Z12.31 OTHER SCREENING MAMMOGRAM: Primary | ICD-10-CM

## 2021-05-24 ENCOUNTER — OFFICE VISIT CONVERTED (OUTPATIENT)
Dept: NEUROLOGY | Facility: CLINIC | Age: 41
End: 2021-05-24
Attending: PSYCHIATRY & NEUROLOGY

## 2021-06-05 NOTE — H&P
History and Physical      Patient Name: Zaria Santacruz   Patient ID: [id number]   Sex: Female   YOB: 1980    Primary Care Provider: Joanna FERRER   Referring Provider: Joanna FERRER    Visit Date: May 24, 2021    Provider: Radhames Martinez MD   Location: [facility]   Location Address: [facility] [employer]  [address] [place] [address]  [id number]   Location Phone: [phone]          Chief Complaint            History Of Present Illness  Zaria Santacruz is a 40 year old /White female who presents today to [facility] today referred from Joanna FERRER.      41-year-old woman evaluated for numbness tingling in her right hand for the last 3 months.  She states that it happens when she is doing activities such as working and driving.  It last for about a minute when her hands get numb and tingly.  It involves her entire hand.  She states that in the morning her hands are also numb and tingly when she wakes up.  Sometimes the pains goes all the way up her forearm and arm.  She has to shake her hands at times.  She states that she has been worked up for arthritis.  She is here today for nerve conduction study.       Review of Systems  · Constitutional  o Denies  o : chills, excessive sweating, fatigue, fever, sycope/passing out, weight gain, weight loss  · Eyes  o Denies  o : changes in vision, blurry vision, double vision  · HENT  o Denies  o : loss of hearing, ringing in the ears, ear aches, sore throat, nasal congestion, sinus pain, nose bleeds, seasonal allergies  · Cardiovascular  o Denies  o : blood clots, swollen legs, anemia, easy burising or bleeding, transfusions  · Respiratory  o Denies  o : shortness of breath, dry cough, productive cough, pneumonia, COPD  · Gastrointestinal  o Denies  o : difficulty swallowing, reflux  · Genitourinary  o Denies  o : incontinence  · Neurologic  o Denies  o : headache, seizure, stroke, tremor, loss of balance,  falls, dizziness/vertigo, difficulty with sleep, numbness/tingling/paresthesia , difficulty with coordination, difficulty with dexterity, weakness  · Musculoskeletal  o Denies  o : neck stiffness/pain, swollen lymph nodes, muscle aches, joint pain, weakness, spasms, sciatica, pain radiating in arm, pain radiating in leg, low back pain  · Endocrine  o Denies  o : diabetes, thyroid disorder  · Psychiatric  o Denies  o : anxiety, depression      Vitals  Date Time BP Position Site L\R Cuff Size HR RR TEMP (F) WT  HT  BMI kg/m2 BSA m2 O2 Sat FR L/min FiO2 HC       05/24/2021 02:30 PM        96.7                 Physical Examination     She is alert, fluent, phasic, follows commands well.  There is no weakness of the upper extremities and with muscle testing.  There is no weakness of intrinsic hand muscles.  Flexor decrease in the biceps, triceps, brachioradialis.  Phalen sign is positive in the right hand.           Assessment  · Carpal tunnel syndrome     354.0/G56.00  The study is normal and does not show electrophysiologic evidence for carpal tunnel syndrome or ulnar neuropathy at the elbow. Clinically she may have carpal tunnel syndrome. I have given her wrist splints to wear nightly and during the daytime as much as possible see if her symptoms improve. If it does not improve I discussed with her that her symptoms may be secondary to referred symptomatology from her wrist and shoulder pain. She has no clinical evidence of cervical radiculopathy.    Total time spent with patient coordinating patient care was 15 minutes.      Plan  · Orders  o Nerve conduction studies; 5-6 studies (64908) - 354.0/G56.00 - 05/24/2021  · Medications  o Medications have been Reconciled  o Transition of Care or Provider Policy  · Instructions  o Encouraged to follow-up with Primary Care Provider for preventative care.            Electronically Signed by: Radhames Martinez MD -Author on May 24, 2021 03:42:45 PM

## 2021-06-08 ENCOUNTER — OFFICE VISIT (OUTPATIENT)
Dept: FAMILY MEDICINE CLINIC | Facility: CLINIC | Age: 41
End: 2021-06-08

## 2021-06-08 VITALS
WEIGHT: 207.2 LBS | BODY MASS INDEX: 34.52 KG/M2 | DIASTOLIC BLOOD PRESSURE: 71 MMHG | RESPIRATION RATE: 19 BRPM | HEART RATE: 66 BPM | SYSTOLIC BLOOD PRESSURE: 112 MMHG | OXYGEN SATURATION: 100 % | HEIGHT: 65 IN

## 2021-06-08 DIAGNOSIS — Z12.4 PAP SMEAR FOR CERVICAL CANCER SCREENING: Primary | ICD-10-CM

## 2021-06-08 PROBLEM — I87.9 VEIN DISORDER: Status: ACTIVE | Noted: 2021-06-08

## 2021-06-08 PROCEDURE — G0123 SCREEN CERV/VAG THIN LAYER: HCPCS | Performed by: NURSE PRACTITIONER

## 2021-06-08 PROCEDURE — 87624 HPV HI-RISK TYP POOLED RSLT: CPT | Performed by: NURSE PRACTITIONER

## 2021-06-08 NOTE — PROGRESS NOTES
"Chief Complaint  Gynecologic Exam (repeat pap smear )    Subjective            Zaria Santacruz presents to [facility]  Patient is here to repeat her pap smear. She has no issues or concerns at this time.     Gynecologic Exam          PMH  Past Medical History:   Diagnosis Date   • Aftercare 04/23/2018    FOLLOWING EXCISION OF LEFT ANTEROLATERAL ANKLY CYST 4/11/2018   • Aftercare 04/27/2018    FOLLOWING LEFT ANKLE CYST EXCISION   • Mass of ankle 03/29/2018   • Vein disorder        ALLERGY  Allergies   Allergen Reactions   • Hydrocodone-Acetaminophen Nausea And Vomiting   • Hydrocodone-Ibuprofen Nausea And Vomiting        SURGICALHX  Past Surgical History:   Procedure Laterality Date   • CYST REMOVAL Right 1998    ANKLE   • GASTRIC BYPASS  2013   • HYSTERECTOMY  2015    PARTIAL    • KNEE ACL RECONSTRUCTION Left 1999        SOCX  Social History     Tobacco Use   • Smoking status: Former Smoker     Packs/day: 0.50     Years: 17.00     Pack years: 8.50     Types: Cigarettes   • Smokeless tobacco: Never Used   • Tobacco comment: AGES 13/32   Substance Use Topics   • Alcohol use: Yes     Comment: OCCASIONALLY       FAMHX  Family History   Problem Relation Age of Onset   • Stroke Father    • Skin cancer Maternal Grandmother    • Heart disease Maternal Grandfather    • Diabetes Maternal Grandfather    • Heart disease Paternal Grandfather         MEDSIGONLY  No current outpatient medications on file prior to visit.     No current facility-administered medications on file prior to visit.       Health Maintenance Due   Topic Date Due   • ANNUAL PHYSICAL  Never done   • COVID-19 Vaccine (1) Never done   • TDAP/TD VACCINES (2 - Td or Tdap) 01/01/2020   • HEPATITIS C SCREENING  Never done       Objective     /71 (BP Location: Left arm, Patient Position: Sitting, Cuff Size: Adult)   Pulse 66   Resp 19   Ht 165.1 cm (65\")   Wt 94 kg (207 lb 3.2 oz)   SpO2 100%   BMI 34.48 kg/m²   "     Physical Exam  Constitutional:       General: She is awake. She is not in acute distress.     Appearance: Normal appearance. She is normal weight. She is not ill-appearing.   HENT:      Head: Normocephalic.      Right Ear: Tympanic membrane, ear canal and external ear normal.      Left Ear: Tympanic membrane, ear canal and external ear normal.      Nose: Nose normal.      Mouth/Throat:      Mouth: Mucous membranes are moist.      Pharynx: Oropharynx is clear.   Eyes:      Extraocular Movements: Extraocular movements intact.      Conjunctiva/sclera: Conjunctivae normal.      Pupils: Pupils are equal, round, and reactive to light.   Cardiovascular:      Rate and Rhythm: Normal rate and regular rhythm.      Pulses: Normal pulses.      Heart sounds: Normal heart sounds, S1 normal and S2 normal.   Pulmonary:      Effort: Pulmonary effort is normal.      Breath sounds: Normal breath sounds.   Chest:      Breasts:         Right: Normal. No swelling, mass, nipple discharge, skin change or tenderness.         Left: Normal. No swelling, mass, nipple discharge, skin change or tenderness.   Abdominal:      General: Abdomen is flat. Bowel sounds are normal.      Palpations: Abdomen is soft.      Tenderness: There is no abdominal tenderness.   Genitourinary:     General: Normal vulva.      Pubic Area: No rash.       Labia:         Right: No rash, tenderness or lesion.         Left: No rash, tenderness or lesion.       Urethra: No urethral pain, urethral swelling or urethral lesion.      Vagina: No vaginal discharge.      Rectum: Normal.   Musculoskeletal:         General: Normal range of motion.      Cervical back: Normal range of motion and neck supple.      Right lower leg: No edema.      Left lower leg: No edema.   Skin:     General: Skin is warm.      Findings: No lesion or rash.   Neurological:      General: No focal deficit present.      Mental Status: She is alert and oriented to person, place, and time. Mental status  is at baseline.      Cranial Nerves: Cranial nerves are intact.      Motor: Motor function is intact.      Coordination: Coordination is intact.      Gait: Gait is intact.   Psychiatric:         Attention and Perception: Attention and perception normal.         Mood and Affect: Mood and affect normal.         Speech: Speech normal.         Behavior: Behavior normal. Behavior is cooperative.         Thought Content: Thought content normal.         Cognition and Memory: Cognition and memory normal.         Judgment: Judgment normal.           Result Review :                           Assessment and Plan        Diagnoses and all orders for this visit:    1. Pap smear for cervical cancer screening (Primary)  -     Liquid-based Pap Smear, Screening; Future              Follow Up     No follow-ups on file.    Patient was given instructions and counseling regarding her condition or for health maintenance advice. Please see specific information pulled into the AVS if appropriate.     Zaria Santacruz  reports that she has quit smoking. Her smoking use included cigarettes. She has a 8.50 pack-year smoking history. She has never used smokeless tobacco...           NABIL Remy

## 2021-06-15 ENCOUNTER — TELEPHONE (OUTPATIENT)
Dept: FAMILY MEDICINE CLINIC | Facility: CLINIC | Age: 41
End: 2021-06-15

## 2021-06-15 LAB
CYTOLOGIST CVX/VAG CYTO: NORMAL
CYTOLOGY CVX/VAG DOC CYTO: NORMAL
DX ICD CODE: NORMAL
HIV 1 & 2 AB SER-IMP: NORMAL
Lab: NORMAL
OTHER STN SPEC: NORMAL
STAT OF ADQ CVX/VAG CYTO-IMP: NORMAL

## 2021-06-15 NOTE — TELEPHONE ENCOUNTER
----- Message from NABIL Remy sent at 6/15/2021 10:40 AM EDT -----  Negative pap repeat in 2-3 years

## 2021-07-15 VITALS — TEMPERATURE: 96.7 F

## 2021-08-18 ENCOUNTER — TELEPHONE (OUTPATIENT)
Dept: FAMILY MEDICINE CLINIC | Facility: CLINIC | Age: 41
End: 2021-08-18

## 2021-08-18 NOTE — TELEPHONE ENCOUNTER
Caller: Zaria Santacruz    Relationship: Self    Best call back number: [phone]      What was the call regarding: PATIENT WAS CALLING TO SCHEDULED MY CHART VISIT NO APPOINTMENT AVAILABLE. PATIENT SAYS SHE THINKS SHE HAS AN INFECTED BLISTER ON RT BIG TOE. PLEASE ADVISE     Do you require a callback: YES

## 2021-10-20 ENCOUNTER — HOSPITAL ENCOUNTER (EMERGENCY)
Facility: HOSPITAL | Age: 41
Discharge: HOME | End: 2021-10-20
Attending: EMERGENCY MEDICINE | Admitting: EMERGENCY MEDICINE

## 2021-10-20 ENCOUNTER — APPOINTMENT (OUTPATIENT)
Dept: CARDIOLOGY | Facility: HOSPITAL | Age: 41
End: 2021-10-20

## 2021-10-20 VITALS
RESPIRATION RATE: 14 BRPM | SYSTOLIC BLOOD PRESSURE: 124 MMHG | DIASTOLIC BLOOD PRESSURE: 77 MMHG | HEART RATE: 66 BPM | TEMPERATURE: 98.4 F | HEIGHT: 65 IN | OXYGEN SATURATION: 97 % | WEIGHT: 205.47 LBS | BODY MASS INDEX: 34.23 KG/M2

## 2021-10-20 DIAGNOSIS — M79.89 RIGHT LEG SWELLING: Primary | ICD-10-CM

## 2021-10-20 LAB
BH CV LOWER VASCULAR LEFT COMMON FEMORAL AUGMENT: NORMAL
BH CV LOWER VASCULAR LEFT COMMON FEMORAL COMPETENT: NORMAL
BH CV LOWER VASCULAR LEFT COMMON FEMORAL COMPRESS: NORMAL
BH CV LOWER VASCULAR LEFT COMMON FEMORAL PHASIC: NORMAL
BH CV LOWER VASCULAR LEFT COMMON FEMORAL SPONT: NORMAL
BH CV LOWER VASCULAR RIGHT COMMON FEMORAL AUGMENT: NORMAL
BH CV LOWER VASCULAR RIGHT COMMON FEMORAL COMPETENT: NORMAL
BH CV LOWER VASCULAR RIGHT COMMON FEMORAL COMPRESS: NORMAL
BH CV LOWER VASCULAR RIGHT COMMON FEMORAL PHASIC: NORMAL
BH CV LOWER VASCULAR RIGHT COMMON FEMORAL SPONT: NORMAL
BH CV LOWER VASCULAR RIGHT DISTAL FEMORAL COMPRESS: NORMAL
BH CV LOWER VASCULAR RIGHT GREATER SAPH AK COMPRESS: NORMAL
BH CV LOWER VASCULAR RIGHT MID FEMORAL AUGMENT: NORMAL
BH CV LOWER VASCULAR RIGHT MID FEMORAL COMPETENT: NORMAL
BH CV LOWER VASCULAR RIGHT MID FEMORAL COMPRESS: NORMAL
BH CV LOWER VASCULAR RIGHT MID FEMORAL PHASIC: NORMAL
BH CV LOWER VASCULAR RIGHT MID FEMORAL SPONT: NORMAL
BH CV LOWER VASCULAR RIGHT PERONEAL COMPRESS: NORMAL
BH CV LOWER VASCULAR RIGHT POPLITEAL AUGMENT: NORMAL
BH CV LOWER VASCULAR RIGHT POPLITEAL COMPETENT: NORMAL
BH CV LOWER VASCULAR RIGHT POPLITEAL COMPRESS: NORMAL
BH CV LOWER VASCULAR RIGHT POPLITEAL PHASIC: NORMAL
BH CV LOWER VASCULAR RIGHT POPLITEAL SPONT: NORMAL
BH CV LOWER VASCULAR RIGHT POSTERIOR TIBIAL COMPRESS: NORMAL
BH CV LOWER VASCULAR RIGHT PROXIMAL FEMORAL COMPRESS: NORMAL
MAXIMAL PREDICTED HEART RATE: 177 BPM
STRESS TARGET HR: 150 BPM

## 2021-10-20 PROCEDURE — 93971 EXTREMITY STUDY: CPT

## 2021-10-20 PROCEDURE — 93971 EXTREMITY STUDY: CPT | Performed by: SURGERY

## 2021-10-20 PROCEDURE — 99282 EMERGENCY DEPT VISIT SF MDM: CPT

## 2021-10-20 NOTE — DISCHARGE INSTRUCTIONS
You can wear compression stockings and elevate legs.  Ibuprofen or Tylenol for pain.  See your doctor for follow up.  Return to ED for new/worsening symptoms.

## 2021-10-20 NOTE — ED PROVIDER NOTES
Subjective   Pt is 40 yo female presenting to ED with complaint of redness and lower leg swelling. Pt states was treated for cellulitis last week. States redness has improved, but concerned for swelling.  [place] [facility] [place] told possible superficial thrombophlebitis. She was recommended to have US today to rule out DVT.           Review of Systems   Constitutional: Negative for chills, fatigue and fever.   HENT: Negative for ear pain, rhinorrhea and sore throat.    Eyes: Negative for visual disturbance.   Respiratory: Negative for cough and shortness of breath.    Cardiovascular: Positive for leg swelling. Negative for chest pain.   Gastrointestinal: Negative for abdominal pain, diarrhea and vomiting.   Genitourinary: Negative for difficulty urinating.   Musculoskeletal: Negative for arthralgias, back pain and myalgias.   Skin: Positive for color change. Negative for rash.   Neurological: Negative for light-headedness and headaches.   Hematological: Negative for adenopathy.   Psychiatric/Behavioral: Negative.        Past Medical History:   Diagnosis Date   • Aftercare 04/23/2018    FOLLOWING EXCISION OF LEFT ANTEROLATERAL ANKLY CYST 4/11/2018   • Aftercare 04/27/2018    FOLLOWING LEFT ANKLE CYST EXCISION   • Mass of ankle 03/29/2018   • Vein disorder        Allergies   Allergen Reactions   • Hydrocodone-Acetaminophen Nausea And Vomiting   • Hydrocodone-Ibuprofen Nausea And Vomiting       Past Surgical History:   Procedure Laterality Date   • CYST REMOVAL Right 1998    ANKLE   • GASTRIC BYPASS  2013   • HYSTERECTOMY  2015    PARTIAL    • KNEE ACL RECONSTRUCTION Left 1999       Family History   Problem Relation Age of Onset   • Stroke Father    • Skin cancer Maternal Grandmother    • Heart disease Maternal Grandfather    • Diabetes Maternal Grandfather    • Heart disease Paternal Grandfather        Social History     Socioeconomic History   • Marital status:    Tobacco Use   • Smoking status: Former  Smoker     Packs/day: 0.50     Years: 17.00     Pack years: 8.50     Types: Cigarettes   • Smokeless tobacco: Never Used   • Tobacco comment: AGES [age]   Vaping Use   • Vaping Use: Never used   Substance and Sexual Activity   • Alcohol use: Yes     Comment: OCCASIONALLY   • Drug use: Never   • Sexual activity: Yes     Partners: Male     Birth control/protection: Surgical     Comment: full hysterctomy            Objective   Physical Exam  Vitals and nursing note reviewed.   Constitutional:       General: She is not in acute distress.     Appearance: Normal appearance. She is not toxic-appearing.   HENT:      Head: Normocephalic and atraumatic.   Cardiovascular:      Rate and Rhythm: Normal rate and regular rhythm.      Pulses: Normal pulses.      Heart sounds: Normal heart sounds.   Pulmonary:      Effort: Pulmonary effort is normal.      Breath sounds: Normal breath sounds.   Abdominal:      General: Bowel sounds are normal.      Palpations: Abdomen is soft.      Tenderness: There is no abdominal tenderness.   Musculoskeletal:         General: Normal range of motion.      Cervical back: Normal range of motion.      Right lower leg: Edema (faint redness noted to right lower leg, no signs of infection) present.   Skin:     General: Skin is warm and dry.   Neurological:      General: No focal deficit present.      Mental Status: She is alert and oriented to person, place, and time.   Psychiatric:         Mood and Affect: Mood normal.         Behavior: Behavior normal.         Thought Content: Thought content normal.         Judgment: Judgment normal.         Procedures           ED Course      1651: Per NVL tech, pt is negative for DVT.  1652: Discussed ED findings with pt and plan for discharge. Pt verbalized understanding and agrees with plan.                                      MDM  Number of Diagnoses or Management Options     Amount and/or Complexity of Data Reviewed  Tests in the radiology section of CPT®:  reviewed    Risk of Complications, Morbidity, and/or Mortality  Presenting problems: low  Diagnostic procedures: moderate  Management options: moderate    Patient Progress  Patient progress: stable      Final diagnoses:   Right leg swelling       ED Disposition  ED Disposition     ED Disposition Condition Comment    Discharge Stable           Joanna Faulkner, NABIL  [address]  [place] [address]  [phone]             Swapna Saleem APRN  10/20/21 1704       Swapna Saleem APRN  10/20/21 1708

## 2021-10-20 NOTE — ED NOTES
Pt presents with the complaint of redness/pain to the RLE. Pt was seen at  and was told it was cellulitis, pt returned after completing antibotics and the pain and increased.      Terese Booker, RN  10/20/21 0711

## 2021-11-18 ENCOUNTER — PATIENT MESSAGE (OUTPATIENT)
Dept: FAMILY MEDICINE CLINIC | Facility: CLINIC | Age: 41
End: 2021-11-18

## 2021-11-18 DIAGNOSIS — L98.7 EXCESS SKIN: Primary | ICD-10-CM

## 2021-11-19 ENCOUNTER — TELEPHONE (OUTPATIENT)
Dept: FAMILY MEDICINE CLINIC | Facility: CLINIC | Age: 41
End: 2021-11-19

## 2021-11-19 NOTE — TELEPHONE ENCOUNTER
Caller: Zaria Santacruz    Relationship to patient: Self    Best call back number: [phone]    Chief complaint: SWOLLEN LUMP ON RIGHT LEG THAT'S WARM AND PAINFUL TO THE TOUCH    Type of visit: OFFICE VISIT    Requested date: AS SOON AS POSSIBLE    If rescheduling, when is the original appointment: 12.16.2021    Additional notes: SCHEDULED THE FIRST AVAILABLE TIME WITH PCP THAT WOULD WORK FOR PATIENT. 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

## 2021-12-16 ENCOUNTER — LAB (OUTPATIENT)
Dept: LAB | Facility: HOSPITAL | Age: 41
End: 2021-12-16

## 2021-12-16 ENCOUNTER — OFFICE VISIT (OUTPATIENT)
Dept: FAMILY MEDICINE CLINIC | Facility: CLINIC | Age: 41
End: 2021-12-16

## 2021-12-16 VITALS
OXYGEN SATURATION: 98 % | SYSTOLIC BLOOD PRESSURE: 112 MMHG | WEIGHT: 211 LBS | BODY MASS INDEX: 35.16 KG/M2 | HEART RATE: 72 BPM | DIASTOLIC BLOOD PRESSURE: 54 MMHG | HEIGHT: 65 IN

## 2021-12-16 DIAGNOSIS — Z01.89 ROUTINE LAB DRAW: ICD-10-CM

## 2021-12-16 DIAGNOSIS — Z13.220 SCREENING FOR LIPID DISORDERS: ICD-10-CM

## 2021-12-16 DIAGNOSIS — M79.661 RIGHT CALF PAIN: Primary | ICD-10-CM

## 2021-12-16 DIAGNOSIS — I83.893 VARICOSE VEINS OF BOTH LEGS WITH EDEMA: ICD-10-CM

## 2021-12-16 DIAGNOSIS — Z13.29 SCREENING FOR THYROID DISORDER: ICD-10-CM

## 2021-12-16 LAB
ALBUMIN SERPL-MCNC: 4 G/DL (ref 3.5–5.2)
ALBUMIN/GLOB SERPL: 1.5 G/DL
ALP SERPL-CCNC: 57 U/L (ref 39–117)
ALT SERPL W P-5'-P-CCNC: 12 U/L (ref 1–33)
ANION GAP SERPL CALCULATED.3IONS-SCNC: 7.3 MMOL/L (ref 5–15)
AST SERPL-CCNC: 19 U/L (ref 1–32)
BASOPHILS # BLD AUTO: 0.07 10*3/MM3 (ref 0–0.2)
BASOPHILS NFR BLD AUTO: 1.2 % (ref 0–1.5)
BILIRUB SERPL-MCNC: 0.4 MG/DL (ref 0–1.2)
BUN SERPL-MCNC: 15 MG/DL (ref 6–20)
BUN/CREAT SERPL: 19.5 (ref 7–25)
CALCIUM SPEC-SCNC: 9.3 MG/DL (ref 8.6–10.5)
CHLORIDE SERPL-SCNC: 103 MMOL/L (ref 98–107)
CHOLEST SERPL-MCNC: 134 MG/DL (ref 0–200)
CO2 SERPL-SCNC: 28.7 MMOL/L (ref 22–29)
CREAT SERPL-MCNC: 0.77 MG/DL (ref 0.57–1)
DEPRECATED RDW RBC AUTO: 40.7 FL (ref 37–54)
EOSINOPHIL # BLD AUTO: 0.12 10*3/MM3 (ref 0–0.4)
EOSINOPHIL NFR BLD AUTO: 2.1 % (ref 0.3–6.2)
ERYTHROCYTE [DISTWIDTH] IN BLOOD BY AUTOMATED COUNT: 11.9 % (ref 12.3–15.4)
GFR SERPL CREATININE-BSD FRML MDRD: 82 ML/MIN/1.73
GLOBULIN UR ELPH-MCNC: 2.6 GM/DL
GLUCOSE SERPL-MCNC: 83 MG/DL (ref 65–99)
HCT VFR BLD AUTO: 39 % (ref 34–46.6)
HDLC SERPL-MCNC: 60 MG/DL (ref 40–60)
HGB BLD-MCNC: 12.5 G/DL (ref 12–15.9)
IMM GRANULOCYTES # BLD AUTO: 0.01 10*3/MM3 (ref 0–0.05)
IMM GRANULOCYTES NFR BLD AUTO: 0.2 % (ref 0–0.5)
LDLC SERPL CALC-MCNC: 64 MG/DL (ref 0–100)
LDLC/HDLC SERPL: 1.1 {RATIO}
LYMPHOCYTES # BLD AUTO: 2.21 10*3/MM3 (ref 0.7–3.1)
LYMPHOCYTES NFR BLD AUTO: 39 % (ref 19.6–45.3)
MCH RBC QN AUTO: 29.6 PG (ref 26.6–33)
MCHC RBC AUTO-ENTMCNC: 32.1 G/DL (ref 31.5–35.7)
MCV RBC AUTO: 92.2 FL (ref 79–97)
MONOCYTES # BLD AUTO: 0.57 10*3/MM3 (ref 0.1–0.9)
MONOCYTES NFR BLD AUTO: 10.1 % (ref 5–12)
NEUTROPHILS NFR BLD AUTO: 2.69 10*3/MM3 (ref 1.7–7)
NEUTROPHILS NFR BLD AUTO: 47.4 % (ref 42.7–76)
NRBC BLD AUTO-RTO: 0 /100 WBC (ref 0–0.2)
PLATELET # BLD AUTO: 229 10*3/MM3 (ref 140–450)
PMV BLD AUTO: 11 FL (ref 6–12)
POTASSIUM SERPL-SCNC: 4 MMOL/L (ref 3.5–5.2)
PROT SERPL-MCNC: 6.6 G/DL (ref 6–8.5)
RBC # BLD AUTO: 4.23 10*6/MM3 (ref 3.77–5.28)
SODIUM SERPL-SCNC: 139 MMOL/L (ref 136–145)
T4 FREE SERPL-MCNC: 1.2 NG/DL (ref 0.93–1.7)
TRIGL SERPL-MCNC: 41 MG/DL (ref 0–150)
TSH SERPL DL<=0.05 MIU/L-ACNC: 1.95 UIU/ML (ref 0.27–4.2)
VLDLC SERPL-MCNC: 10 MG/DL (ref 5–40)
WBC NRBC COR # BLD: 5.67 10*3/MM3 (ref 3.4–10.8)

## 2021-12-16 PROCEDURE — 84439 ASSAY OF FREE THYROXINE: CPT

## 2021-12-16 PROCEDURE — 85025 COMPLETE CBC W/AUTO DIFF WBC: CPT

## 2021-12-16 PROCEDURE — 80053 COMPREHEN METABOLIC PANEL: CPT

## 2021-12-16 PROCEDURE — 84443 ASSAY THYROID STIM HORMONE: CPT

## 2021-12-16 PROCEDURE — 99213 OFFICE O/P EST LOW 20 MIN: CPT | Performed by: NURSE PRACTITIONER

## 2021-12-16 PROCEDURE — 80061 LIPID PANEL: CPT

## 2021-12-16 PROCEDURE — 36415 COLL VENOUS BLD VENIPUNCTURE: CPT

## 2021-12-16 NOTE — PROGRESS NOTES
Chief Complaint  Leg Pain/rash    Subjective            Zaria Santacruz presents to [facility]  Pt has c/o (R) calf pain. Pt has had VELE and was WNL. Pt was also treated for cellulitis and still hasn't gone away. Pt states it is still painful, red, swelling.  It is not warm to touch. Pt has been resting, elevating, ice/heat. Pt has not used compression due to the pain. Pt states it is painful to touch, will start hurting worse if on feet for long periods of time, and at times, will itch. It is red in color. She feels a rope like area under the skin that swells as the day goes on.  She has bad vericose veins  In her left left.  She feels like this is the issue and wants to see someone about this.    She is due lab work        [facility]  Past Medical History:   Diagnosis Date   • Aftercare 04/23/2018    FOLLOWING EXCISION OF LEFT ANTEROLATERAL ANKLY CYST 4/11/2018   • Aftercare 04/27/2018    FOLLOWING LEFT ANKLE CYST EXCISION   • Mass of ankle 03/29/2018   • Vein disorder        ALLERGY  Allergies   Allergen Reactions   • Hydrocodone-Acetaminophen Nausea And Vomiting   • Hydrocodone-Ibuprofen Nausea And Vomiting        SURGICALHX  Past Surgical History:   Procedure Laterality Date   • BARIATRIC SURGERY  November 2013   • CYST REMOVAL Right 1998    ANKLE   • GASTRIC BYPASS  2013   • HYSTERECTOMY  2015    PARTIAL    • KNEE ACL RECONSTRUCTION Left 1999        SOCX  Social History     Tobacco Use   • Smoking status: Former Smoker     Packs/day: 0.50     Years: 17.00     Pack years: 8.50     Types: Cigarettes   • Smokeless tobacco: Never Used   • Tobacco comment: AGES [age]   Substance Use Topics   • Alcohol use: Yes     Alcohol/week: 0.0 standard drinks     Comment: OCCASIONALLY       FAMHX  Family History   Problem Relation Age of Onset   • Stroke Father    • Skin cancer Maternal Grandmother    • Heart disease Maternal Grandfather    • Diabetes Maternal Grandfather    • Heart disease Paternal  "Grandfather         MEDSIGONLY  No current outpatient medications on file prior to visit.     No current facility-administered medications on file prior to visit.       Health Maintenance Due   Topic Date Due   • ANNUAL PHYSICAL  Never done   • COVID-19 Vaccine (1) Never done   • TDAP/TD VACCINES (2 - Td or Tdap) 01/01/2020   • HEPATITIS C SCREENING  Never done   • INFLUENZA VACCINE  Never done       Objective     /54   Pulse 72   Ht 165.1 cm (65\")   Wt 95.7 kg (211 lb)   SpO2 98%   BMI 35.11 kg/m²       Physical Exam  Constitutional:       General: She is not in acute distress.     Appearance: Normal appearance. She is not ill-appearing.   HENT:      Head: Normocephalic and atraumatic.      Mouth/Throat:      Pharynx: No oropharyngeal exudate or posterior oropharyngeal erythema.   Cardiovascular:      Rate and Rhythm: Normal rate and regular rhythm.      Heart sounds: Normal heart sounds. No murmur heard.       Comments: varicose veins noted in patrick LE, left LE calf tender to touch  Pulmonary:      Effort: Pulmonary effort is normal. No respiratory distress.      Breath sounds: Normal breath sounds.   Chest:      Chest wall: No tenderness.   Abdominal:      General: There is no distension.      Palpations: There is no mass.      Tenderness: There is no abdominal tenderness. There is no guarding.   Musculoskeletal:         General: No swelling or tenderness. Normal range of motion.      Cervical back: Normal range of motion and neck supple.   Skin:     General: Skin is warm and dry.      Findings: No rash.   Neurological:      General: No focal deficit present.      Mental Status: She is alert and oriented to person, place, and time. Mental status is at baseline.      Gait: Gait normal.   Psychiatric:         Mood and Affect: Mood normal.         Behavior: Behavior normal.         Thought Content: Thought content normal.         Judgment: Judgment normal.           Result Review :                         "   Assessment and Plan        Diagnoses and all orders for this visit:    1. Right calf pain (Primary)  -     Ambulatory Referral to Vascular Surgery    2. Varicose veins of both legs with edema  Comments:  encouraged OLVIN hose  Orders:  -     Ambulatory Referral to Vascular Surgery    3. Screening for thyroid disorder  -     CBC w AUTO Differential; Future  -     TSH; Future  -     T4, free; Future    4. Screening for lipid disorders  -     Comprehensive metabolic panel; Future  -     Lipid panel; Future    5. Routine lab draw  -     CBC w AUTO Differential; Future  -     Comprehensive metabolic panel; Future  -     Lipid panel; Future  -     TSH; Future  -     T4, free; Future              Follow Up     Return if symptoms worsen or fail to improve.    Patient was given instructions and counseling regarding her condition or for health maintenance advice. Please see specific information pulled into the AVS if appropriate.     Zaria Kimse  reports that she has quit smoking. Her smoking use included cigarettes. She has a 8.50 pack-year smoking history. She has never used smokeless tobacco..

## 2021-12-17 ENCOUNTER — TELEPHONE (OUTPATIENT)
Dept: FAMILY MEDICINE CLINIC | Facility: CLINIC | Age: 41
End: 2021-12-17

## 2022-01-17 ENCOUNTER — OFFICE VISIT (OUTPATIENT)
Dept: PLASTIC SURGERY | Facility: CLINIC | Age: 42
End: 2022-01-17

## 2022-01-17 VITALS
HEART RATE: 62 BPM | OXYGEN SATURATION: 97 % | SYSTOLIC BLOOD PRESSURE: 131 MMHG | TEMPERATURE: 98 F | DIASTOLIC BLOOD PRESSURE: 79 MMHG | HEIGHT: 65 IN | BODY MASS INDEX: 35.32 KG/M2 | WEIGHT: 212 LBS

## 2022-01-17 DIAGNOSIS — N64.82 HYPOMASTIA: ICD-10-CM

## 2022-01-17 DIAGNOSIS — L98.7 EXCESSIVE AND REDUNDANT SKIN AND SUBCUTANEOUS TISSUE: ICD-10-CM

## 2022-01-17 DIAGNOSIS — M79.3 PANNICULITIS: Primary | ICD-10-CM

## 2022-01-17 PROCEDURE — 99204 OFFICE O/P NEW MOD 45 MIN: CPT | Performed by: NURSE PRACTITIONER

## 2022-01-17 NOTE — PROGRESS NOTES
"Chief Complaint  Establish Care (Recon excess skin)    Subjective     {Problem List  Visit Diagnosis   Encounters  Notes  Medications  Labs  Result Review Imaging  Media :23}          HPI   Zaria Santacruz is a 41 y.o. female who presents to [facility] as a consult from NABIL Remy  for   Chief Complaint   Patient presents with   • Establish Care     Recon excess skin   .   Patient has had Gastric bypass. Starting weight is 296 lbs, current weight is 212 lbs, goal weight 200 lbs. Patient weight has been stable at current weight for 3 years. Patient has rash and moisture under abdominal fold. Treats rash with Nystatin powder and ointments . History of surgeries: gastric bypass laparoscopic, partial hysterectomy.  Patient has 4 children, is done having children.     Has excess skin over lower back and hips as well, would like quote for back lift at time of surgery.   Breast are small since losing weight and would like see if a lift is needed and if implants are possible. Does not have updated mammogram.      Allergies: Hydrocodone-acetaminophen and Hydrocodone-ibuprofen  Allergies Reconciled.    Review of Systems  All system were reviewed and were negative, except the ones noted above.     Objective     /79 (BP Location: Right arm, Patient Position: Sitting)   Pulse 62   Temp 98 °F (36.7 °C) (Temporal)   Ht 165.1 cm (65\")   Wt 96.2 kg (212 lb)   SpO2 97%   BMI 35.28 kg/m²     Body mass index is 35.28 kg/m².    Physical Exam   • Patient is not tachycardic   • Respiration is non elaborated.   • Excess skin above and below umbilicus with grade 2 panniculus. Significant moisture, hyperpigmentation, and irritation over lower abdominal fold, does not have palpable hernia, and no open areas. Well healed port scars.   Breast:   Masses: No masses  Nipple Discharge: No nipple discharge  Breast Symmetry:  Axillary Lymphadenopathy: No axillary " lymphadenopathy  SN-N (Right Breast): 30  SN-N (Left Breast):30  N-IMF (Right Breast):7  N-IMF (Left Breast):7    Base Width:13    Body surface area is 2.03 meters squared.     Result Review :       Procedures              Diagnoses and all orders for this visit:    1. Panniculitis (Primary)    2. Excessive and redundant skin and subcutaneous tissue    3. Hypomastia        Plan:  • I had a long discussion with the patient regarding her  panniculitis. I have explained to her  that I believe she would benefit from panniculectomy to address her excess hanging skin. I explained that this would involve a low transverse incision and would only address the excess skin below the umbilicus. Patient was told about the possible utilization of an additional vertical incision which is often used to further improve the abdominal contour.She  understands that this is not a cosmetic body contouring procedure.  Scar position was discussed, including long transverse incision. We discussed recovery time which would include no heavy lifting for 4-6 weeks, drains, and abdominal binder. We discussed risks including but not limited to bleeding, infection, pain, wound healing problems, contour irregularities, blood clot, and anesthetic risk .  •   • The patient would be an excellent candidate for modified abdominoplasty in addition to panniculectomy and she understands this portion would be cosmetic. Scar position was discussed, including low transverse incision and possibly utilization of a vertical incision to improve contour. Discussed anesthetic risk and risks including, but not limited to wound healing problems, scar, blood clot, fluid collection, infection. Patient understands no heavy lifting or working out for 6 weeks and importance of ambulating post op to prevent a blood clot.  • Discussed in future she could be a candidate for mastopexy and breast augmentation but this is cosmetic and note covered by insurance.  • In case she  opts for surgery, she would go home with drains and abdominal binder. Photographs were obtained today. I will write to the insurance company to obtain insurance coverage for this procedure.  • RTC for pre-op with Dr. 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           Follow Up     No follow-ups on file.    Patient was given instructions and counseling regarding her condition. Please see specific information pulled into the AVS if appropriate.     Bridgett Chisholm, APRN  01/17/2022

## 2022-03-16 ENCOUNTER — PREP FOR SURGERY (OUTPATIENT)
Dept: OTHER | Facility: HOSPITAL | Age: 42
End: 2022-03-16

## 2022-03-16 DIAGNOSIS — L98.7 EXCESSIVE AND REDUNDANT SKIN AND SUBCUTANEOUS TISSUE: Primary | ICD-10-CM

## 2022-03-16 DIAGNOSIS — M79.3 PANNICULITIS: ICD-10-CM

## 2022-03-16 RX ORDER — CEFAZOLIN SODIUM 2 G/100ML
2 INJECTION, SOLUTION INTRAVENOUS ONCE
Status: CANCELLED | OUTPATIENT
Start: 2022-03-16 | End: 2022-03-16

## 2022-03-17 ENCOUNTER — TELEPHONE (OUTPATIENT)
Dept: PLASTIC SURGERY | Facility: CLINIC | Age: 42
End: 2022-03-17

## 2022-03-17 NOTE — TELEPHONE ENCOUNTER
Left voicemail, calling to pick surgery date, looking at 06/02/22. Please call back ASAP to confirm this date or choose another. Need to hear back by end of business day on 03/22/22 or will move to next patient.

## 2022-03-25 NOTE — TELEPHONE ENCOUNTER
Called patient to see if she would like to schedule surgery. Patient would like to wait at this time. She will call when she is ready.

## 2022-06-02 PROCEDURE — 87081 CULTURE SCREEN ONLY: CPT

## 2022-06-02 PROCEDURE — U0004 COV-19 TEST NON-CDC HGH THRU: HCPCS

## 2022-10-28 ENCOUNTER — TRANSCRIBE ORDERS (OUTPATIENT)
Dept: PHYSICAL THERAPY | Facility: CLINIC | Age: 42
End: 2022-10-28

## 2022-10-28 DIAGNOSIS — S86.912A KNEE STRAIN, LEFT, INITIAL ENCOUNTER: Primary | ICD-10-CM

## 2022-10-31 ENCOUNTER — TREATMENT (OUTPATIENT)
Dept: PHYSICAL THERAPY | Facility: CLINIC | Age: 42
End: 2022-10-31

## 2022-11-01 ENCOUNTER — TREATMENT (OUTPATIENT)
Dept: PHYSICAL THERAPY | Facility: CLINIC | Age: 42
End: 2022-11-01

## 2022-11-01 DIAGNOSIS — M25.562 LEFT MEDIAL KNEE PAIN: Primary | ICD-10-CM

## 2022-11-01 DIAGNOSIS — R26.9 GAIT DISTURBANCE: ICD-10-CM

## 2022-11-01 DIAGNOSIS — M25.662 DECREASED RANGE OF MOTION OF LEFT KNEE: ICD-10-CM

## 2022-11-01 DIAGNOSIS — R29.898 WEAKNESS OF LEFT LOWER EXTREMITY: ICD-10-CM

## 2022-11-01 PROCEDURE — 97161 PT EVAL LOW COMPLEX 20 MIN: CPT

## 2022-11-01 PROCEDURE — 97110 THERAPEUTIC EXERCISES: CPT

## 2022-11-01 NOTE — PROGRESS NOTES
Physical Therapy Initial Evaluation and Plan of Care      [place] [facility]: [address] [place] [address]      Patient: Zaria Santacruz   : 1980  Diagnosis/ICD-10 Code:  Left medial knee pain [M25.562]  Referring practitioner: Fer Little MD  Date of Initial Visit: 2022  Today's Date: 2022  Patient seen for 1 sessions           Subjective Questionnaire: LEFS: 49/80      Subjective   Pt reports to physical therapy w/ complaints of L knee pain. Pt reports they were at school and they were attempting to restrain one of the students. During that time, the pt injured their L knee. Pt reports increased pain and swelling w/n their L knee. Pt reports the pain increases any time they have to bend their knee. Pt states the worst their pain can get up to is a 6/10 and this is with bending and twisting their knee. Pt also reports times of buckling, popping, and catching w/n their knee since the injury. Pt is able to experience times with no pain and that is when they are sitting or keeping their knee in an extended position. Pt is currently in no pain as they are sitting.     Pt has a past medical hx of a R ACL. Pt has no prior medical hx of L knee pathology.       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     Hiking, camping,...       Objective          Palpation     Additional Palpation Details  Pt has increased tenderness w/ palpation to medial joint line. Pt has some point tenderness to LCL.    Neurological Testing     Additional Neurological Details  Pt has intact sensation to light touch that is equal bilaterally consistent w/ L2-S1 dermatomes.     Active Range of Motion   Left Knee   Flexion: 100 degrees with pain  Extension: 2 (hyper ext) degrees with pain    Right Knee   Flexion: 120 degrees   Extension: 0 degrees     Additional Active Range of Motion Details  Pt has increased  pain w/ ext OP to L knee.       Strength/Myotome Testing     Left Hip   Planes of Motion   Flexion: 4  Extension: 4  Abduction: 4+    Right Hip   Planes of Motion   Flexion: 5  Extension: 4+  Abduction: 4+    Left Knee   Flexion: 4  Extension: 4    Right Knee   Flexion: 5  Extension: 5    Left Ankle/Foot   Dorsiflexion: 5    Right Ankle/Foot   Dorsiflexion: 5    Tests     Left Knee   Positive medial Jocelyn, Thessaly's test at 5 degrees and Thessaly's test at 20 degrees.   Negative anterior drawer, anterior Lachman, valgus stress test at 30 degrees and varus stress test at 30 degrees.       See Exercise, Manual, and Modality Logs for complete treatment.       Assessment & Plan     Assessment  Impairments: abnormal gait, abnormal muscle firing, abnormal or restricted ROM, activity intolerance, impaired balance, impaired physical strength and pain with function  Functional Limitations: walking, uncomfortable because of pain, standing and stooping  Assessment details: Pt reports to physical therapy w/ complaints of acute L knee pain. Pt presents w/ decreased ROM, strength, balance, and activity tolerance. Pt has self reported deficits described in LEFS. Pt also has positive tests consistent w/ pathology w/n the L knee. Pt will benefit from skilled physical therapy in order to address their current impairments in order to improve upon knee stability, gait, and functional mobility in order for the pt to perform work tasks and hobbies without pain.   Prognosis: good    Goals  Plan Goals: KNEE PROBLEMS:     1. The patient has limited ROM of the L knee.   LTG 1: 12 weeks:  The patient will demonstrate 0 to 120 degrees of ROM for the L knee in order to allow patient to perform squatting and work tasks without limitation.    STATUS:  New   STG 1a: 6 weeks:  The patient will demonstrate 0 to 110 degrees of ROM for the L knee.    STATUS:  New   TREATMENT: Manual therapy, therapeutic exercise, home exercise instruction, and  modalities as needed to include:  moist heat, electrical stimulation, and ice.    2. The patient has limited strength of the L knee.   LTG 2: 12 weeks: The patient will demonstrate 5 /5 strength for L knee flexion and extension in order to allow patient improved joint stability    STATUS:  New   STG 2a: 6 weeks: The patient will demonstrate 4+ /5 strength for L knee flexion and extension    STATUS:  New    TREATMENT: Manual therapy, therapeutic exercise, home exercise instruction, aquatic therapy, and modalities as needed to include:  moist heat, electrical stimulation, ultrasound, and ice.     3. The patient has gait dysfunction.   LTG 3: 12 weeks:  The patient will ambulate independently, for community distances with minimal limp to the L lower extremity in order to improve mobility and allow patient to perform activities such as work tasks and hiking for recreation with greater ease.    STATUS:  New   STG 3a: 6 weeks:     STATUS:  New   TREATMENT: Gait training, aquatic therapy, therapeutic exercise, and home exercise instruction.    4. Mobility: Walking/Moving Around Functional Limitation     LTG 4: 12 weeks:  The patient will demonstrate 25 % limitation by achieving a score of 60/80 on the LEFS.    STATUS:  New   STG 4 a: 6 weeks:  The patient will demonstrate 35 % limitation by achieving a score of 52/80 on the LEFS.      STATUS:  New   TREATMENT:  Manual therapy, therapeutic exercise, home exercise instruction, and modalities as needed to include: moist heat, electrical stimulation, and ultrasound.              PLAN:  Therapy options: will receive skilled therapy services  Planned modality interventions: Cryotherapy  Planned therapy interventions:balance/weight-bearing training, ADL retraining, soft tissue mobilization, strengthening, stretching, therapeutic activities, manual therapy, joint mobilization, home exercise program/patient education, gait training, functional ROM exercises, flexibility, body  mechanics training, postural training and neuromuscular re-education  Frequency: 2x per week  Duration in weeks: 12  Treatment plan discussed with: patient        Visit Diagnoses:    ICD-10-CM ICD-9-CM   1. Left medial knee pain  M25.562 719.46   2. Decreased range of motion of left knee  M25.662 719.56   3. Gait disturbance  R26.9 781.2   4. Weakness of left lower extremity  R29.898 729.89       History # of Personal Factors and/or Comorbidities: LOW (0)  Examination of Body System(s): # of elements: LOW (1-2)  Clinical Presentation: STABLE   Clinical Decision Making: LOW       Timed:         Manual Therapy:    0     mins  57990;     Therapeutic Exercise:    15     mins  79751;     Neuromuscular Cameron:    0    mins  94358;    Therapeutic Activity:     0     mins  98535;     Gait Trainin     mins  83106;     Ultrasound:     0     mins  02132;    Ionto                               0    mins   89434  Self Care                       0     mins   97432  Canalith Repos    0     mins 20169      Un-Timed:  Electrical Stimulation:    0     mins  21490 ( );  Dry Needling     0     mins self-pay  Traction     0     mins 16022  Low Eval     30     Mins  15939  Mod Eval     0     Mins  24423  High Eval                       0     Mins  72920  Re-Eval                           0    mins  32736    Timed Treatment:   15   mins   Total Treatment:     45   mins    PT SIGNATURE: Jake Shaikh PT, DPT    Electronically signed 2022    [place] License: PT - [id number]    Supervised by Hieu Saleem PT DPT [place] License PT - [id number]    Initial Certification  Certification Period: 2022 thru 2023  I certify that the therapy services are furnished while this patient is under my care.  The services outlined above are required by this patient, and will be reviewed every 90 days.     PHYSICIAN: Fer Little MD  NPI: [id number]      DATE:     Please sign and return via fax to [phone]. Thank you, [facility]  Physical Therapy.

## 2022-11-03 ENCOUNTER — TELEPHONE (OUTPATIENT)
Dept: PHYSICAL THERAPY | Facility: CLINIC | Age: 42
End: 2022-11-03

## 2022-11-07 ENCOUNTER — TREATMENT (OUTPATIENT)
Dept: PHYSICAL THERAPY | Facility: CLINIC | Age: 42
End: 2022-11-07

## 2022-11-07 DIAGNOSIS — M25.562 LEFT MEDIAL KNEE PAIN: Primary | ICD-10-CM

## 2022-11-07 DIAGNOSIS — R29.898 WEAKNESS OF LEFT LOWER EXTREMITY: ICD-10-CM

## 2022-11-07 DIAGNOSIS — R26.9 GAIT DISTURBANCE: ICD-10-CM

## 2022-11-07 DIAGNOSIS — M25.662 DECREASED RANGE OF MOTION OF LEFT KNEE: ICD-10-CM

## 2022-11-07 PROCEDURE — 97110 THERAPEUTIC EXERCISES: CPT | Performed by: PHYSICAL THERAPIST

## 2022-11-07 NOTE — PROGRESS NOTES
Outpatient Physical Therapy  [address] [place], [place] [address]                 Physical Therapy Daily Treatment Note    Patient: Zaria Santacruz   : 1980  Diagnosis/ICD-10 Code:  Left medial knee pain [M25.562]  Referring practitioner: Fer Little MD  Date of Initial Visit: Type: THERAPY  Noted: 2022  Today's Date: 2022  Patient seen for 2 sessions             Subjective   Zaria Santacruz reports: HEP exercises are going well and patient requested a new copy of her HEP.     Pain: 3/10 pain currently     Objective     See Exercise, Manual, and Modality Logs for complete treatment.     Assessment/Plan  Progressed patient's strengthening exercises and patient tolerated well with no reports of increase in pain. Patient demonstrated understanding of HEP exercises and a new copy of HEP was given to patient.     Progress per Plan of Care    Timed:  Manual Therapy:    0     mins  10015;  Therapeutic Exercise:    24     mins  28367;     Neuromuscular Cameron:    0    mins  13518;    Therapeutic Activity:     0     mins  57000;     Gait Trainin     mins  82277;    Aquatic Therapy:     0     mins  76860;       Untimed:  Electrical Stimulation:    0     mins  86887 ( );  Mechanical Traction:    0     mins  04664;       Timed Treatment:   24   mins   Total Treatment:     24   mins      Electronically signed:   Nalini Solomon PTA  Physical Therapist Assistant  Rachel ANNA License #: [id number]

## 2022-11-10 ENCOUNTER — TREATMENT (OUTPATIENT)
Dept: PHYSICAL THERAPY | Facility: CLINIC | Age: 42
End: 2022-11-10

## 2022-11-10 DIAGNOSIS — R29.898 WEAKNESS OF LEFT LOWER EXTREMITY: ICD-10-CM

## 2022-11-10 DIAGNOSIS — M25.662 DECREASED RANGE OF MOTION OF LEFT KNEE: ICD-10-CM

## 2022-11-10 DIAGNOSIS — M25.562 LEFT MEDIAL KNEE PAIN: Primary | ICD-10-CM

## 2022-11-10 DIAGNOSIS — R26.9 GAIT DISTURBANCE: ICD-10-CM

## 2022-11-10 PROCEDURE — 97110 THERAPEUTIC EXERCISES: CPT

## 2022-11-10 PROCEDURE — 97530 THERAPEUTIC ACTIVITIES: CPT

## 2022-11-10 NOTE — PROGRESS NOTES
Physical Therapy Daily Treatment Note  [place] [facility]: [address] [place] [address]      Patient: Zaria Santacruz   : 1980  Diagnosis/ICD-10 Code:  Left medial knee pain [M25.562]  Referring practitioner: Fer Little MD  Date of Initial Visit: Type: THERAPY  Noted: 2022  Today's Date: 11/10/2022  Patient seen for 3 sessions           Subjective   The patient reported they are currently experiencing a 2/10 pain. Pt has seen overall progression w/ therapy. Pt reports less buckling of their knee. Pt does not tolerate deep squats well at this time.     Objective   See Exercise, Manual, and Modality Logs for complete treatment.     Assessment/Plan  Pt tolerates increase in exs today. Pt still struggles w/ deeper squats at this time due to increased pain. Patient will continue to benefit from skilled physical therapy to further address their deficits in strength and ROM in order to improve upon their functional mobility and to meet their personal goals.          Timed:  Manual Therapy:    0     mins  15451;  Therapeutic Exercise:    15     mins  87692;     Neuromuscular Cameron:   0    mins  93238;    Therapeutic Activity:     15     mins  92744;     Gait Trainin     mins  48266;     Aquatics                         0      mins  67044    Un-timed:  Mechanical Traction      0     mins  72291  Electrical Stimulation:    0     mins  96274 ( );      Timed Treatment:   30   mins   Total Treatment:     30   mins    Jake Shaikh PT, DPT    Electronically signed 11/10/2022    [place] License: PT - [id number]     Supervised by Hieu Saleem PT, DPT [place] License PT - [id number]

## 2022-11-21 ENCOUNTER — TELEPHONE (OUTPATIENT)
Dept: PHYSICAL THERAPY | Facility: CLINIC | Age: 42
End: 2022-11-21

## 2022-11-22 ENCOUNTER — TREATMENT (OUTPATIENT)
Dept: PHYSICAL THERAPY | Facility: CLINIC | Age: 42
End: 2022-11-22

## 2022-11-22 DIAGNOSIS — M25.562 LEFT MEDIAL KNEE PAIN: Primary | ICD-10-CM

## 2022-11-22 DIAGNOSIS — M25.662 DECREASED RANGE OF MOTION OF LEFT KNEE: ICD-10-CM

## 2022-11-22 DIAGNOSIS — R29.898 WEAKNESS OF LEFT LOWER EXTREMITY: ICD-10-CM

## 2022-11-22 DIAGNOSIS — R26.9 GAIT DISTURBANCE: ICD-10-CM

## 2022-11-22 PROCEDURE — 97110 THERAPEUTIC EXERCISES: CPT | Performed by: PHYSICAL THERAPIST

## 2022-11-22 PROCEDURE — 97530 THERAPEUTIC ACTIVITIES: CPT | Performed by: PHYSICAL THERAPIST

## 2022-11-22 NOTE — PROGRESS NOTES
Physical Therapy Daily Treatment Note      Patient: Zaria Santacruz   : 1980  Referring practitioner: Fer Little MD  Date of Initial Visit: Type: THERAPY  Noted: 2022  Today's Date: 2022  Patient seen for 4 sessions           Subjective Questionnaire:       Subjective Evaluation    History of Present Illness    Subjective comment: Pt reports her L knee is doing really good.  Pt explained that she forgot two visits and was out of town yesterday but wants to be at PT and will be here.         Objective   See Exercise, Manual, and Modality Logs for complete treatment.       Assessment & Plan     Assessment    Assessment details: Pt tolerated all strengthening exercise well.  Pt reports that when her L knee gets tired as in prolonged walking it breanna.  Pt will benefit from continued strengthening.  Continue with POC        Visit Diagnoses:    ICD-10-CM ICD-9-CM   1. Left medial knee pain  M25.562 719.46   2. Decreased range of motion of left knee  M25.662 719.56   3. Gait disturbance  R26.9 781.2   4. Weakness of left lower extremity  R29.898 729.89       Progress per Plan of Care and Progress strengthening /stabilization /functional activity           Timed:  Manual Therapy:         mins  49520;  Therapeutic Exercise:    23     mins  30512;     Neuromuscular Cameron:        mins  27497;    Therapeutic Activity:     10     mins  96192;     Gait Training:           mins  83870;     Ultrasound:          mins  31015;    Electrical Stimulation:         mins  02034 ( );  Aquatic Therapy          mins  43363    Untimed:  Electrical Stimulation:         mins  45210 ( );  Mechanical Traction:         mins  39604;     Timed Treatment: 33     mins   Total Treatment:     33   mins    Electronically signed    Feli Ramachandran PTA  Physical Therapist Assistant    [place] license: [id number]

## 2022-11-23 ENCOUNTER — TREATMENT (OUTPATIENT)
Dept: PHYSICAL THERAPY | Facility: CLINIC | Age: 42
End: 2022-11-23

## 2022-11-23 DIAGNOSIS — M25.662 DECREASED RANGE OF MOTION OF LEFT KNEE: ICD-10-CM

## 2022-11-23 DIAGNOSIS — M25.562 LEFT MEDIAL KNEE PAIN: Primary | ICD-10-CM

## 2022-11-23 DIAGNOSIS — R26.9 GAIT DISTURBANCE: ICD-10-CM

## 2022-11-23 DIAGNOSIS — R29.898 WEAKNESS OF LEFT LOWER EXTREMITY: ICD-10-CM

## 2022-11-23 PROCEDURE — 97110 THERAPEUTIC EXERCISES: CPT | Performed by: PHYSICAL THERAPIST

## 2022-11-23 PROCEDURE — 97112 NEUROMUSCULAR REEDUCATION: CPT | Performed by: PHYSICAL THERAPIST

## 2022-11-23 PROCEDURE — 97530 THERAPEUTIC ACTIVITIES: CPT | Performed by: PHYSICAL THERAPIST

## 2022-11-23 NOTE — PROGRESS NOTES
Outpatient Physical Therapy  [address] [facility], [place] [address]                            Physical Therapy Daily Treatment Note    Patient: Zaria Santacruz   : 1980  Diagnosis/ICD-10 Code:  Left medial knee pain [M25.562]  Referring practitioner: Fer Little MD  Date of Initial Visit: Type: THERAPY  Noted: 2022  Today's Date: 2022  Patient seen for 5 sessions             Subjective   Zaria Santacruz reports: knee feeling better and states that she is not sore from PT session yesterday.     Objective   No complaints of increased pain or discomfort    See Exercise, Manual, and Modality Logs for complete treatment.     Assessment/Plan  Zaria progressing as evident by decreased overall knee pain. Pt tolerated exercises well, no complaints of increased pain or discomfort. Pt would benefit from skilled PT to address Range of Motion  and Strength deficits, pain management and any concerns with ADLs.         Progress per Plan of Care         Timed:  Manual Therapy:         mins  73403;  Therapeutic Exercise:    12     mins  26306;     Neuromuscular Cameron:    8    mins  04362;    Therapeutic Activity:     10     mins  15585;     Gait Training:           mins  08715;    Aquatic Therapy:          mins  32616;       Untimed:  Electrical Stimulation:         mins  56440 ( );  Mechanical Traction:         mins  82746;       Timed Treatment:   30   mins   Total Treatment:     30   mins      Electronically signed:   Ashley Gagnon PTA  Physical Therapist Assistant  hospitals License #: [id number]

## 2022-11-29 ENCOUNTER — TREATMENT (OUTPATIENT)
Dept: PHYSICAL THERAPY | Facility: CLINIC | Age: 42
End: 2022-11-29

## 2022-11-29 DIAGNOSIS — R29.898 WEAKNESS OF LEFT LOWER EXTREMITY: ICD-10-CM

## 2022-11-29 DIAGNOSIS — M25.562 LEFT MEDIAL KNEE PAIN: Primary | ICD-10-CM

## 2022-11-29 DIAGNOSIS — M25.662 DECREASED RANGE OF MOTION OF LEFT KNEE: ICD-10-CM

## 2022-11-29 DIAGNOSIS — R26.9 GAIT DISTURBANCE: ICD-10-CM

## 2022-11-29 PROCEDURE — 97110 THERAPEUTIC EXERCISES: CPT

## 2022-11-29 NOTE — PROGRESS NOTES
Progress Note / Discharge Summary   [facility]: [address] [facility]   [place] [address]      Patient: Zaria Santacruz   : 1980  Diagnosis/ICD-10 Code:  Left medial knee pain [M25.562]  Referring practitioner: Fer Little MD  Date of Initial Visit: Type: THERAPY  Noted: 2022  Today's Date: 2022  Patient seen for 6 sessions      Subjective:   Subjective Questionnaire: LEFS: 67/80  Clinical Progress: improved  Home Program Compliance: Yes  Treatment has included: balance/weight-bearing training, ADL retraining, soft tissue mobilization, strengthening, stretching, therapeutic activities, manual therapy, joint mobilization, home exercise program/patient education, gait training, functional ROM exercises, flexibility, body mechanics training, postural training and neuromuscular re-education    Subjective   Pt reports they are not having any knee pain at this time. Pt reports functionally they are not limited by their L knee with household and work tasks. Pt does still have some buckling in their knee, but this only occurs when their LEs are fatigued. Pt reports this happens very few and far between.       Objective       Active Range of Motion   Left Knee   Flexion: 125 degrees with pain  Extension: 2 (hyper ext) degrees with pain     Right Knee   Flexion: 120 degrees   Extension: 0 degrees     Additional Active Range of Motion Details  Pt has increased pain w/ ext OP to L knee.       Strength/Myotome Testing      Left Hip   Planes of Motion   Flexion: 5  Extension: 4+  Abduction: 4+     Right Hip   Planes of Motion   Flexion: 5  Extension: 4+  Abduction: 4+     Left Knee   Flexion: 5  Extension: 5     Right Knee   Flexion: 5  Extension: 5     Left Ankle/Foot   Dorsiflexion: 5     Right Ankle/Foot   Dorsiflexion: 5  See Exercise, Manual, and Modality Logs for complete treatment.     Assessment/Plan   Pt reported to physical therapy w/ complaints of acute L knee pain. Pt has shown improvement  in both ROM as well as strength while in therapy. Pt also has decreased perceived deficits described by LEFS. Discussed with pt about return to gym to perform HEP. Pt given instruction on how to perform exs at the gym and what equipment to use. Pts goals have been addressed at this time. Pt is safe to discharge from physical therapy w/ their current HEP.      Goals  Plan Goals: KNEE PROBLEMS:     1. The patient has limited ROM of the L knee.              LTG 1: 12 weeks:  The patient will demonstrate 0 to 120 degrees of ROM for the L knee in order to allow patient to perform squatting and work tasks without limitation.                          STATUS: met              STG 1a: 6 weeks:  The patient will demonstrate 0 to 110 degrees of ROM for the L knee.                          STATUS:  met              TREATMENT: Manual therapy, therapeutic exercise, home exercise instruction, and modalities as needed to include:  moist heat, electrical stimulation, and ice.    2. The patient has limited strength of the L knee.              LTG 2: 12 weeks: The patient will demonstrate 5 /5 strength for L knee flexion and extension in order to allow patient improved joint stability                          STATUS:  met              STG 2a: 6 weeks: The patient will demonstrate 4+ /5 strength for L knee flexion and extension                          STATUS:  met              TREATMENT: Manual therapy, therapeutic exercise, home exercise instruction, aquatic therapy, and modalities as needed to include:  moist heat, electrical stimulation, ultrasound, and ice.                3. The patient has gait dysfunction.              LTG 3: 12 weeks:  The patient will ambulate independently, for community distances with minimal limp to the L lower extremity in order to improve mobility and allow patient to perform activities such as work tasks and hiking for recreation with greater ease.                          STATUS:  met              STG  3a: 6 weeks:                           STATUS:  met              TREATMENT: Gait training, aquatic therapy, therapeutic exercise, and home exercise instruction.    4. Mobility: Walking/Moving Around Functional Limitation                               LTG 4: 12 weeks:  The patient will demonstrate 25 % limitation by achieving a score of 60/80 on the LEFS.                          STATUS:  met              STG 4 a: 6 weeks:  The patient will demonstrate 35 % limitation by achieving a score of 52/80 on the LEFS.                            STATUS:  met              TREATMENT:  Manual therapy, therapeutic exercise, home exercise instruction, and modalities as needed to include: moist heat, electrical stimulation, and ultrasound.  Progress toward previous goals: All Met      Recommendations: Discharge    PT Signature: Jake Shaikh PT, DPT    Electronically signed 2022    [place] License: PT - [id number]       Supervised by Hieu Saleem PT, DPT [place] License PT - [id number]      Timed:  Manual Therapy:    0     mins  90049;  Therapeutic Exercise:    25     mins  54530;     Neuromuscular Cameron:    0    mins  24675;    Therapeutic Activity:     0     mins  93967;     Gait Trainin     mins  12457;     Aquatics                         0      mins  01894    Un-timed:  Mechanical Traction      0     mins  67617  Dry Needling     0     mins self-pay  Electrical Stimulation:    0     mins  35031 ( );    Timed Treatment:   25   mins   Total Treatment:     40   mins

## 2023-01-30 ENCOUNTER — OFFICE VISIT (OUTPATIENT)
Dept: FAMILY MEDICINE CLINIC | Facility: CLINIC | Age: 43
End: 2023-01-30
Payer: OTHER GOVERNMENT

## 2023-01-30 VITALS
BODY MASS INDEX: 36.61 KG/M2 | OXYGEN SATURATION: 98 % | HEART RATE: 57 BPM | WEIGHT: 220 LBS | SYSTOLIC BLOOD PRESSURE: 117 MMHG | DIASTOLIC BLOOD PRESSURE: 71 MMHG

## 2023-01-30 DIAGNOSIS — N39.0 URINARY TRACT INFECTION IN FEMALE: ICD-10-CM

## 2023-01-30 DIAGNOSIS — R35.0 FREQUENT URINATION: Primary | ICD-10-CM

## 2023-01-30 LAB
BILIRUB BLD-MCNC: NEGATIVE MG/DL
CLARITY, POC: CLEAR
COLOR UR: YELLOW
EXPIRATION DATE: ABNORMAL
GLUCOSE UR STRIP-MCNC: NEGATIVE MG/DL
KETONES UR QL: ABNORMAL
LEUKOCYTE EST, POC: NEGATIVE
Lab: ABNORMAL
NITRITE UR-MCNC: NEGATIVE MG/ML
PH UR: 5.5 [PH] (ref 5–8)
PROT UR STRIP-MCNC: NEGATIVE MG/DL
RBC # UR STRIP: NEGATIVE /UL
SP GR UR: 1.01 (ref 1–1.03)
UROBILINOGEN UR QL: NORMAL

## 2023-01-30 PROCEDURE — 99213 OFFICE O/P EST LOW 20 MIN: CPT

## 2023-01-30 PROCEDURE — 81003 URINALYSIS AUTO W/O SCOPE: CPT

## 2023-01-30 PROCEDURE — 87086 URINE CULTURE/COLONY COUNT: CPT

## 2023-01-30 RX ORDER — CEPHALEXIN 500 MG/1
500 CAPSULE ORAL 2 TIMES DAILY
Qty: 14 CAPSULE | Refills: 0 | Status: SHIPPED | OUTPATIENT
Start: 2023-01-30 | End: 2023-02-06

## 2023-01-30 RX ORDER — PHENAZOPYRIDINE HYDROCHLORIDE 200 MG/1
200 TABLET, FILM COATED ORAL 3 TIMES DAILY PRN
Qty: 6 TABLET | Refills: 0 | Status: SHIPPED | OUTPATIENT
Start: 2023-01-30 | End: 2023-02-01

## 2023-01-30 NOTE — PROGRESS NOTES
Chief Complaint  Difficulty Urinating (Patient is having painful, burning and frequent urination X1 day )    SUBJECTIVE  Zaria Santacruz presents to [facility]    History of Present Illness  42-year-old Zaria Santacruz presents today for an acute visit.  Patient is a patient of Joanna Faulkner.  She presents today with complaints of lower back pain, urinary frequency, urinary burning.  States that he she has been trying to drink more fluids to help with symptoms.  She is also experiencing headaches.  She is not taking anything over-the-counter for symptoms.  In office UA does not show any leukocytes.  Discussed with patient that we will send urine off for culture.  It does show trace ketones.      Past Medical History:   Diagnosis Date   • Aftercare 04/23/2018    FOLLOWING EXCISION OF LEFT ANTEROLATERAL ANKLY CYST 4/11/2018   • Aftercare 04/27/2018    FOLLOWING LEFT ANKLE CYST EXCISION   • Mass of ankle 03/29/2018   • Vein disorder       Family History   Problem Relation Age of Onset   • Stroke Father    • Skin cancer Maternal Grandmother    • Heart disease Maternal Grandfather    • Diabetes Maternal Grandfather    • Heart disease Paternal Grandfather       Past Surgical History:   Procedure Laterality Date   • BARIATRIC SURGERY  November 2013   • CYST REMOVAL Right 1998    ANKLE   • GASTRIC BYPASS  2013   • HYSTERECTOMY  2015    PARTIAL    • KNEE ACL RECONSTRUCTION Left 1999        Current Outpatient Medications:   •  cephalexin (Keflex) 500 MG capsule, Take 1 capsule by mouth 2 (Two) Times a Day for 7 days., Disp: 14 capsule, Rfl: 0  •  phenazopyridine (Pyridium) 200 MG tablet, Take 1 tablet by mouth 3 (Three) Times a Day As Needed for Bladder Spasms for up to 2 days., Disp: 6 tablet, Rfl: 0    OBJECTIVE  Vital Signs:   /71 (BP Location: Right arm)   Pulse 57   Wt 99.8 kg (220 lb)   SpO2 98%   BMI 36.61 kg/m²    Estimated body mass index is 36.61 kg/m² as calculated from the  "following:    Height as of 1/17/22: 165.1 cm (65\").    Weight as of this encounter: 99.8 kg (220 lb).     Wt Readings from Last 3 Encounters:   01/30/23 99.8 kg (220 lb)   06/02/22 96.2 kg (212 lb)   01/17/22 96.2 kg (212 lb)     BP Readings from Last 3 Encounters:   01/30/23 117/71   06/02/22 110/77   01/17/22 131/79       Physical Exam  Vitals and nursing note reviewed.   Constitutional:       Appearance: Normal appearance.   HENT:      Head: Normocephalic and atraumatic.   Eyes:      Conjunctiva/sclera: Conjunctivae normal.      Pupils: Pupils are equal, round, and reactive to light.   Cardiovascular:      Rate and Rhythm: Normal rate and regular rhythm.      Pulses: Normal pulses.      Heart sounds: Normal heart sounds.   Pulmonary:      Effort: Pulmonary effort is normal.      Breath sounds: Normal breath sounds.   Abdominal:      General: Abdomen is flat. Bowel sounds are normal.      Palpations: Abdomen is soft.   Musculoskeletal:         General: Normal range of motion.      Cervical back: Normal range of motion and neck supple.   Skin:     General: Skin is warm and dry.   Neurological:      General: No focal deficit present.      Mental Status: She is alert and oriented to person, place, and time. Mental status is at baseline.   Psychiatric:         Mood and Affect: Mood normal.         Behavior: Behavior normal.         Thought Content: Thought content normal.         Judgment: Judgment normal.          Result Review        XR Knee 3+ View With Sunrise Left    Result Date: 10/27/2022    1. No definite radiographic findings of acute osseous knee abnormality. 2. Mild osteoarthritis.      MARK VELASCO MD       Electronically Signed and Approved By: MARK VELASCO MD on 10/27/2022 at 16:37                 The above data has been reviewed by NABIL Bello 01/30/2023 15:37 EST.          Patient Care Team:  Joanna Faulkner APRN as PCP - General (Nurse Practitioner)           ASSESSMENT & PLAN    Diagnoses " and all orders for this visit:    1. Frequent urination (Primary)  -     Cancel: POC Urine Drug Screen Premier Bio-Cup  -     POCT urinalysis dipstick, automated  -     Urine Culture - Urine, Urine, Clean Catch; Future  -     Urine Culture - Urine, Urine, Clean Catch    2. Urinary tract infection in female  Comments:  Have started patient on Keflex and Pyridium due to symptoms.  We have also sent urine off for urine culture.    Other orders  -     cephalexin (Keflex) 500 MG capsule; Take 1 capsule by mouth 2 (Two) Times a Day for 7 days.  Dispense: 14 capsule; Refill: 0  -     phenazopyridine (Pyridium) 200 MG tablet; Take 1 tablet by mouth 3 (Three) Times a Day As Needed for Bladder Spasms for up to 2 days.  Dispense: 6 tablet; Refill: 0         Tobacco Use: Medium Risk   • Smoking Tobacco Use: Former   • Smokeless Tobacco Use: Never   • Passive Exposure: Never       Follow Up     No follow-ups on file.      Patient was given instructions and counseling regarding her condition or for health maintenance advice. Please see specific information pulled into the AVS if appropriate.   I have reviewed information obtained and documented by others and I have confirmed the accuracy of this documented note.    NABIL Bello      Will discuss with patient in office. We will send out for culture.

## 2023-01-31 LAB — BACTERIA SPEC AEROBE CULT: NO GROWTH

## 2023-07-25 ENCOUNTER — TELEPHONE (OUTPATIENT)
Dept: FAMILY MEDICINE CLINIC | Facility: CLINIC | Age: 43
End: 2023-07-25

## 2023-07-25 NOTE — TELEPHONE ENCOUNTER
Caller: Zaria Santacruz    Relationship: Self    Best call back number: [phone]     What is the medical concern/diagnosis: LEFT KNEE BUCKLING AND GIVING OUT A LOT MORE THAN USUAL    What specialty or service is being requested: ORTHOPEDICS    What is the provider, practice or medical service name: PROVIDER RECOMMENDATION    What is the office location: [place] PREFERABLY    Any additional details: PATIENT IS OKAY WITH COMING IN FOR AN APPOINTMENT BUT FEELS LIKE A REFERRAL WILL BE NECESSARY. PATIENT STATES THAT LEFT KNEE HAS GOTTEN PROGRESSIVELY WORSE OVER THE PAST MONTH.

## 2023-08-27 ENCOUNTER — TELEMEDICINE (OUTPATIENT)
Dept: FAMILY MEDICINE CLINIC | Facility: TELEHEALTH | Age: 43
End: 2023-08-27
Payer: OTHER GOVERNMENT

## 2023-08-27 DIAGNOSIS — U07.1 COVID-19: Primary | ICD-10-CM

## 2023-08-27 RX ORDER — FLUTICASONE PROPIONATE 50 MCG
2 SPRAY, SUSPENSION (ML) NASAL DAILY
Qty: 16 G | Refills: 0 | Status: SHIPPED | OUTPATIENT
Start: 2023-08-27 | End: 2023-09-06

## 2023-08-27 NOTE — PROGRESS NOTES
You have chosen to receive care through a telehealth visit.  Do you consent to use a video/audio connection for your medical care today? Yes     CHIEF COMPLAINT  Chief Complaint   Patient presents with    Nasal Congestion         HPI  Zaria Santacruz is a 43 y.o. female  presents with complaint of pain from congestion in the left side of face including teeth. Dx with COVID 2 days  ago. + fever Tmax 101.5. reports her last fever was last night.  + chills. No nausea or vomiting. No SOA or CP. Reports she has been taking IBU and tyelnol for her symptoms.     Review of Systems   Constitutional:  Positive for chills and fever. Negative for fatigue.        Fever broke last night   HENT:  Positive for congestion, ear pain, sinus pressure and sinus pain. Negative for ear discharge and sore throat.    Respiratory:  Negative for cough, chest tightness, shortness of breath and wheezing.    Cardiovascular:  Negative for chest pain.   Gastrointestinal:  Negative for abdominal pain, diarrhea, nausea and vomiting.   Musculoskeletal:  Negative for back pain and myalgias.   Neurological:  Positive for headaches. Negative for dizziness.   Psychiatric/Behavioral: Negative.       Past Medical History:   Diagnosis Date    Aftercare 04/23/2018    FOLLOWING EXCISION OF LEFT ANTEROLATERAL ANKLY CYST 4/11/2018    Aftercare 04/27/2018    FOLLOWING LEFT ANKLE CYST EXCISION    Mass of ankle 03/29/2018    Vein disorder        Family History   Problem Relation Age of Onset    Stroke Father     Skin cancer Maternal Grandmother     Heart disease Maternal Grandfather     Diabetes Maternal Grandfather     Heart disease Paternal Grandfather        Social History     Socioeconomic History    Marital status:    Tobacco Use    Smoking status: Former     Packs/day: 0.50     Years: 17.00     Pack years: 8.50     Types: Cigarettes     Quit date: 2013     Years since quitting: 10.6     Passive exposure: Never    Smokeless tobacco: Never    Tobacco  comments:     AGES [age]   Vaping Use    Vaping Use: Never used   Substance and Sexual Activity    Alcohol use: Yes     Alcohol/week: 0.0 standard drinks     Comment: OCCASIONALLY    Drug use: Never    Sexual activity: Yes     Partners: Male     Birth control/protection: Surgical     Comment: full hysterctomy        Zaria Santacruz  reports that she quit smoking about 10 years ago. Her smoking use included cigarettes. She has a 8.50 pack-year smoking history. She has never been exposed to tobacco smoke. She has never used smokeless tobacco.. I have educated her on the risk of diseases from using tobacco products such as cancer, COPD, and heart disease.         I spent  1  minutes counseling the patient.              There were no vitals taken for this visit.    PHYSICAL EXAM  Physical Exam   Constitutional: She is oriented to person, place, and time. She appears well-developed and well-nourished. No distress.   HENT:   Head: Normocephalic and atraumatic.   Right Ear: Hearing normal.   Left Ear: Hearing normal.   Nose: Congestion present. Right sinus exhibits no maxillary sinus tenderness. Left sinus exhibits maxillary sinus tenderness.   Mouth/Throat: Mouth/Lips are normal.  Eyes: Conjunctivae and lids are normal.   Pulmonary/Chest: Effort normal.  No respiratory distress.  Neurological: She is alert and oriented to person, place, and time.   Psychiatric: She has a normal mood and affect. Her speech is normal and behavior is normal.     Results for orders placed or performed in visit on 01/30/23   Urine Culture - Urine, Urine, Clean Catch    Specimen: Urine, Clean Catch   Result Value Ref Range    Urine Culture No growth    POCT urinalysis dipstick, automated    Specimen: Urine   Result Value Ref Range    Color Yellow Yellow, Straw, Dark Yellow, [place]    Clarity, UA Clear Clear    Specific Gravity  1.010 1.005 - 1.030    pH, Urine 5.5 5.0 - 8.0    Leukocytes Negative Negative    Nitrite, UA Negative Negative     Protein, POC Negative Negative mg/dL    Glucose, UA Negative Negative mg/dL    Ketones, UA Trace (A) Negative    Urobilinogen, UA Normal Normal, 0.2 E.U./dL    Bilirubin Negative Negative    Blood, UA Negative Negative    Lot Number [id number]     Expiration Date 1/1/2023        Diagnoses and all orders for this visit:    1. COVID-19 (Primary)    Other orders  -     Molnupiravir (LAGEVRIO) 200 MG capsule; Take 4 capsules by mouth Every 12 (Twelve) Hours for 5 days.  Dispense: 40 capsule; Refill: 0  -     fluticasone (FLONASE) 50 MCG/ACT nasal spray; 2 sprays into the nostril(s) as directed by provider Daily for 10 days.  Dispense: 16 g; Refill: 0    Rest  Fluids  Discussed antiviral. Patient doesn't have current labs w/i 1 year. Patient agrees to take Molnupiravir.    PCP if symptoms persist   ER for worsening symptoms such as high fever, chest pain or SOA       FOLLOW-UP  As discussed during visit with PCP/[facility] if no improvement or Urgent Care/Emergency Department if worsening of symptoms    Patient verbalizes understanding of medication dosage, comfort measures, instructions for treatment and follow-up.    NABIL Sheffield  08/27/2023  19:37 EDT    The use of a video visit has been reviewed with the patient and verbal informed consent has been obtained. Myself and Zaria Santacruz participated in this visit. The patient is located in [address] [place] [address].    I am located in [place], [place]. [employer] and [employer] were utilized. I spent 5 minutes in the patient's chart for this visit.

## 2023-08-28 ENCOUNTER — TELEMEDICINE (OUTPATIENT)
Dept: FAMILY MEDICINE CLINIC | Facility: CLINIC | Age: 43
End: 2023-08-28
Payer: OTHER GOVERNMENT

## 2023-08-28 DIAGNOSIS — Z12.31 ENCOUNTER FOR SCREENING MAMMOGRAM FOR MALIGNANT NEOPLASM OF BREAST: ICD-10-CM

## 2023-08-28 DIAGNOSIS — Z13.29 SCREENING FOR THYROID DISORDER: ICD-10-CM

## 2023-08-28 DIAGNOSIS — Z13.220 SCREENING FOR CHOLESTEROL LEVEL: ICD-10-CM

## 2023-08-28 DIAGNOSIS — Z12.11 SCREENING FOR COLON CANCER: ICD-10-CM

## 2023-08-28 DIAGNOSIS — Z11.59 NEED FOR HEPATITIS C SCREENING TEST: ICD-10-CM

## 2023-08-28 DIAGNOSIS — M25.562 ACUTE PAIN OF LEFT KNEE: Primary | ICD-10-CM

## 2023-08-28 PROCEDURE — 99214 OFFICE O/P EST MOD 30 MIN: CPT | Performed by: NURSE PRACTITIONER

## 2023-08-28 NOTE — PROGRESS NOTES
You have chosen to receive care through a telehealth visit.  Do you consent to use a video/audio connection for your medical care today? Yes    Mode of visit: video    Location of patient: [place]    Location of provider: [facility]       Chief Complaint  Knee Pain    Subjective            Zariaopal Santacruz presents to [facility]  History of Present Illness  Pt has c/o (L) knee pain. Pain has been present for approximately 3 mo. Pt reports she does have buckling of the knee at times. Pt has been using RICE method, tylenol and ibuprofen with little relief. NKI. Pt has not had any imaging.     Pt is due labs.    Pt is due mammogram.    Pt is due colon screen.    PT is due pap would like to schedule with our office.      Past Medical History:   Diagnosis Date    Aftercare 04/23/2018    FOLLOWING EXCISION OF LEFT ANTEROLATERAL ANKLY CYST 4/11/2018    Aftercare 04/27/2018    FOLLOWING LEFT ANKLE CYST EXCISION    Mass of ankle 03/29/2018    Vein disorder        Allergies   Allergen Reactions    Hydrocodone GI Intolerance        Past Surgical History:   Procedure Laterality Date    BARIATRIC SURGERY  November 2013    CYST REMOVAL Right 1998    ANKLE    GASTRIC BYPASS  2013    HYSTERECTOMY  2015    PARTIAL     KNEE ACL RECONSTRUCTION Left 1999        Social History     Tobacco Use    Smoking status: Former     Packs/day: 0.50     Years: 17.00     Pack years: 8.50     Types: Cigarettes     Quit date: 2013     Years since quitting: 10.6     Passive exposure: Never    Smokeless tobacco: Never    Tobacco comments:     AGES [age]   Substance Use Topics    Alcohol use: Yes     Alcohol/week: 0.0 standard drinks     Comment: OCCASIONALLY       Family History   Problem Relation Age of Onset    Stroke Father     Skin cancer Maternal Grandmother     Heart disease Maternal Grandfather     Diabetes Maternal Grandfather     Heart disease Paternal Grandfather         Current Outpatient  Medications on File Prior to Visit   Medication Sig    fluticasone (FLONASE) 50 MCG/ACT nasal spray 2 sprays into the nostril(s) as directed by provider Daily for 10 days.    Molnupiravir (LAGEVRIO) 200 MG capsule Take 4 capsules by mouth Every 12 (Twelve) Hours for 5 days.     No current facility-administered medications on file prior to visit.       Health Maintenance Due   Topic Date Due    COLORECTAL CANCER SCREENING  Never done    TDAP/TD VACCINES (2 - Td or Tdap) 01/01/2020    HEPATITIS C SCREENING  Never done    ANNUAL PHYSICAL  Never done    COVID-19 Vaccine (3 - Moderna series) 11/17/2021       Objective     There were no vitals taken for this visit.      Physical Exam  Constitutional:       Appearance: Normal appearance.   Neurological:      Mental Status: She is alert.   Psychiatric:         Attention and Perception: Attention normal.         Mood and Affect: Mood and affect normal.         Speech: Speech normal.         Behavior: Behavior normal. Behavior is cooperative.         Thought Content: Thought content normal.         Judgment: Judgment normal.         Result Review :                           Assessment and Plan        Diagnoses and all orders for this visit:    1. Acute pain of left knee (Primary)  -     XR Knee 3 View Left; Future    2. Screening for cholesterol level  -     CBC w AUTO Differential; Future  -     Comprehensive metabolic panel; Future  -     Lipid panel; Future    3. Screening for thyroid disorder  -     CBC w AUTO Differential; Future  -     TSH; Future    4. Encounter for screening mammogram for malignant neoplasm of breast  -     Mammo Screening Digital Tomosynthesis Bilateral With CAD; Future    5. Screening for colon cancer  -     Ambulatory Referral For Screening Colonoscopy    6. Need for hepatitis C screening test  -     Hepatitis panel, acute; Future              Follow Up     Return in about 6 months (around 2/28/2024).    Patient was given instructions and  counseling regarding her condition or for health maintenance advice. Please see specific information pulled into the AVS if appropriate.     Zaria Santacruz  reports that she quit smoking about 10 years ago. Her smoking use included cigarettes. She has a 8.50 pack-year smoking history. She has never been exposed to tobacco smoke. She has never used smokeless tobacco..

## 2023-08-28 NOTE — PATIENT INSTRUCTIONS
How to Quarantine at Home  Information for Patients and Families    These instructions are for people with confirmed or suspected COVID-19 who do not need to be hospitalized and those with confirmed COVID-19 who were hospitalized and discharged to care for themselves at home.    If you were tested through the Health Department  The Health Department will monitor your wellbeing.  If it is determined that you do not need to be hospitalized and can be isolated at home, you will be monitored by staff from your local or state health department.     If you were tested through a [facility]  You will need to monitor yourself and report changes in your symptoms to your doctor.  See the section below called Monitor Your Symptoms.    Follow these steps until a healthcare provider or local or state health department says you can return to your normal activities.    Stay home except to get medical care  Restrict activities outside your home, except for getting medical care.   Do not go to work, school, or public areas.   Avoid using public transportation, ride-sharing, or taxis.    Separate yourself from other people and animals in your home  People  As much as possible, you should stay in a specific room and away from other people in your home. Also, you should use a separate bathroom, if available.    Animals  You should restrict contact with pets and other animals while you are sick with COVID-19, just like you would around other people. When possible, have another member of your household care for your animals while you are sick. If you are sick with COVID-19, avoid contact with your pet, including petting, snuggling, being kissed or licked, and sharing food. If you must care for your pet or be around animals while you are sick, wash your hands before and after you interact with pets and wear a facemask. See COVID-19 and Animals for more information.    Call ahead before visiting your doctor  If you have a medical  "appointment, call the healthcare provider and tell them that you have or may have COVID-19. This information will help the healthcare provider's office take steps to keep other people from getting infected or exposed.    Wear a facemask  You should wear a facemask when you are around other people (e.g., sharing a room or vehicle) or pets and before you enter a healthcare provider's office.     If you are not able to wear a facemask (for example, because it causes trouble breathing), then people who live with you should not stay in the same room with you, or they should wear a facemask if they enter your room.    Cover your coughs and sneezes  Cover your mouth and nose with a tissue when you cough or sneeze.   Throw used tissues in a lined trash can.   Immediately wash your hands with soap and water for at least 20 seconds or, if soap and water are not available, clean your hands with an alcohol-based hand  that contains at least 60% alcohol.    Clean your hands often  Wash your hands often with soap and water for at least 20 seconds, especially after blowing your nose, coughing, or sneezing; going to the bathroom; and before eating or preparing food.     If soap and water are not readily available, use an alcohol-based hand  with at least 60% alcohol, covering all surfaces of your hands and rubbing them together until they feel dry.    Soap and water are the best option if hands are visibly dirty. Avoid touching your eyes, nose, and mouth with unwashed hands.    Avoid sharing personal household items  You should not share dishes, drinking glasses, cups, eating utensils, towels, or bedding with other people or pets in your home.   After using these items, they should be washed thoroughly with soap and water.    Clean all "high-touch" surfaces everyday  High touch surfaces include counters, tabletops, doorknobs, bathroom fixtures, toilets, phones, keyboards, tablets, and bedside tables.   Also, clean " any surfaces that may have blood, stool, or body fluids on them.   Use a household cleaning spray or wipe, according to the label instructions. Labels contain instructions for safe and effective use of the cleaning product, including precautions you should take when applying the product, such as wearing gloves and making sure you have good ventilation during use of the product.    Monitor your symptoms  Seek prompt medical attention if your illness is worsening (e.g., difficulty breathing).   Before seeking care, call your healthcare provider and tell them that you have, or are being evaluated for, COVID-19.   Put on a facemask before you enter the facility.     These steps will help the healthcare provider's office to keep other people in the office or waiting room from getting infected or exposed.   Persons who are placed under active monitoring or facilitated self-monitoring should follow instructions provided by their local health department or occupational health professionals, as appropriate.  If you have a medical emergency and need to call 911, notify the dispatch personnel that you have, or are being evaluated for COVID-19. If possible, put on a facemask before emergency medical services arrive.    Discontinuing home isolation  Patients with confirmed COVID-19 should remain under home isolation precautions until the risk of secondary transmission to others is thought to be low. The decision to discontinue home isolation precautions should be made on a case-by-case basis, in consultation with healthcare providers and state and local health departments.    The below content are for household members, intimate partners, and caregivers of a patient with symptomatic laboratory-confirmed COVID-19 or a patient under investigation:    Household members, intimate partners, and caregivers may have close contact with a person with symptomatic, laboratory-confirmed COVID-19 or a person under investigation.     Close  contacts should monitor their health; they should call their healthcare provider right away if they develop symptoms suggestive of COVID-19 (e.g., fever, cough, shortness of breath)     Close contacts should also follow these recommendations:  Make sure that you understand and can help the patient follow their healthcare provider's instructions for medication(s) and care. You should help the patient with basic needs in the home and provide support for getting groceries, prescriptions, and other personal needs.  Monitor the patient's symptoms. If the patient is getting sicker, call his or her healthcare provider and tell them that the patient has laboratory-confirmed COVID-19. This will help the healthcare provider's office take steps to keep other people in the office or waiting room from getting infected. Ask the healthcare provider to call the local or [facility] the [facility] health department for additional guidance. If the patient has a medical emergency and you need to call 911, notify the dispatch personnel that the patient has, or is being evaluated for COVID-19.  Household members should stay in another room or be  from the patient as much as possible. Household members should use a separate bedroom and bathroom, if available.  Prohibit visitors who do not have an essential need to be in the home.  Household members should care for any pets in the home. Do not handle pets or other animals while sick.  For more information, see COVID-19 and Animals.  Make sure that shared spaces in the home have good air flow, such as by an air conditioner or an opened window, weather permitting.  Perform hand hygiene frequently. Wash your hands often with soap and water for at least 20 seconds or use an alcohol-based hand  that contains 60 to 95% alcohol, covering all surfaces of your hands and rubbing them together until they feel dry. Soap and water should be used preferentially if hands are visibly dirty.  Avoid touching  "your eyes, nose, and mouth with unwashed hands.  The patient should wear a facemask when you are around other people. If the patient is not able to wear a facemask (for example, because it causes trouble breathing), you, as the caregiver, should wear a mask when you are in the same room as the patient.  Wear a disposable facemask and gloves when you touch or have contact with the patient's blood, stool, or body fluids, such as saliva, sputum, nasal mucus, vomit, or urine.   Throw out disposable facemasks and gloves after using them. Do not reuse.  When removing personal protective equipment, first remove and dispose of gloves. Then, immediately clean your hands with soap and water or alcohol-based hand . Next, remove and dispose of facemask, and immediately clean your hands again with soap and water or alcohol-based hand .  Avoid sharing household items with the patient. You should not share dishes, drinking glasses, cups, eating utensils, towels, bedding, or other items. After the patient uses these items, you should wash them thoroughly (see below "Wash laundry thoroughly").  Clean all "high-touch" surfaces, such as counters, tabletops, doorknobs, bathroom fixtures, toilets, phones, keyboards, tablets, and bedside tables, every day. Also, clean any surfaces that may have blood, stool, or body fluids on them.   Use a household cleaning spray or wipe, according to the label instructions. Labels contain instructions for safe and effective use of the cleaning product including precautions you should take when applying the product, such as wearing gloves and making sure you have good ventilation during use of the product.  Wash laundry thoroughly.   Immediately remove and wash clothes or bedding that have blood, stool, or body fluids on them.  Wear disposable gloves while handling soiled items and keep soiled items away from your body. Clean your hands (with soap and water or an alcohol-based hand " ) immediately after removing your gloves.  Read and follow directions on labels of laundry or clothing items and detergent. In general, using a normal laundry detergent according to washing machine instructions and dry thoroughly using the warmest temperatures recommended on the clothing label.  Place all used disposable gloves, facemasks, and other contaminated items in a lined container before disposing of them with other household waste. Clean your hands (with soap and water or an alcohol-based hand ) immediately after handling these items. Soap and water should be used preferentially if hands are visibly dirty.  Discuss any additional questions with your state or local health department or healthcare provider.    Adapted from information provided by the Centers for Disease Control and Prevention.  For more information, visit https://www.cdc.gov/coronavirus/2019-ncov/hcp/guidance-prevent-spread.htmlUpper Respiratory Infection, Adult  An upper respiratory infection (URI) is a common viral infection of the nose, throat, and upper air passages that lead to the lungs. The most common type of URI is the common cold. URIs usually get better on their own, without medical treatment.  What are the causes?  A URI is caused by a virus. You may catch a virus by:  Breathing in droplets from an infected person's cough or sneeze.  Touching something that has been exposed to the virus (is contaminated) and then touching your mouth, nose, or eyes.  What increases the risk?  You are more likely to get a URI if:  You are very young or very old.  You have close contact with others, such as at work, school, or a health care facility.  You smoke.  You have long-term (chronic) heart or lung disease.  You have a weakened disease-fighting system (immune system).  You have nasal allergies or asthma.  You are experiencing a lot of stress.  You have poor nutrition.  What are the signs or symptoms?  A URI usually involves  some of the following symptoms:  Runny or stuffy (congested) nose.  Cough.  Sneezing.  Sore throat.  Headache.  Fatigue.  Fever.  Loss of appetite.  Pain in your forehead, behind your eyes, and over your cheekbones (sinus pain).  Muscle aches.  Redness or irritation of the eyes.  Pressure in the ears or face.  How is this diagnosed?  This condition may be diagnosed based on your medical history and symptoms, and a physical exam. Your health care provider may use a swab to take a mucus sample from your nose (nasal swab). This sample can be tested to determine what virus is causing the illness.  How is this treated?  URIs usually get better on their own within 7-10 days. Medicines cannot cure URIs, but your health care provider may recommend certain medicines to help relieve symptoms, such as:  Over-the-counter cold medicines.  Cough suppressants. Coughing is a type of defense against infection that helps to clear the respiratory system, so take these medicines only as recommended by your health care provider.  Fever-reducing medicines.  Follow these instructions at home:  Activity  Rest as needed.  If you have a fever, stay home from work or school until your fever is gone or until your health care provider says your URI cannot spread to other people (is no longer contagious). Your health care provider may have you wear a face mask to prevent your infection from spreading.  Relieving symptoms  Gargle with a mixture of salt and water 3-4 times a day or as needed. To make salt water, completely dissolve «-1 tsp (3-6 g) of salt in 1 cup (237 mL) of warm water.  Use a cool-mist humidifier to add moisture to the air. This can help you breathe more easily.  Eating and drinking    Drink enough fluid to keep your urine pale yellow.  Eat soups and other clear broths.  General instructions    Take over-the-counter and prescription medicines only as told by your health care provider. These include cold medicines, fever  reducers, and cough suppressants.  Do not use any products that contain nicotine or tobacco. These products include cigarettes, chewing tobacco, and vaping devices, such as e-cigarettes. If you need help quitting, ask your health care provider.  Stay away from secondhand smoke.  Stay up to date on all immunizations, including the yearly (annual) flu vaccine.  Keep all follow-up visits. This is important.  How to prevent the spread of infection to others  URIs can be contagious. To prevent the infection from spreading:  Wash your hands with soap and water for at least 20 seconds. If soap and water are not available, use hand .  Avoid touching your mouth, face, eyes, or nose.  Cough or sneeze into a tissue or your sleeve or elbow instead of into your hand or into the air.    Contact a health care provider if:  You are getting worse instead of better.  You have a fever or chills.  Your mucus is brown or red.  You have yellow or brown discharge coming from your nose.  You have pain in your face, especially when you bend forward.  You have swollen neck glands.  You have pain while swallowing.  You have white areas in the back of your throat.  Get help right away if:  You have shortness of breath that gets worse.  You have severe or persistent:  Headache.  Ear pain.  Sinus pain.  Chest pain.  You have chronic lung disease along with any of the following:  Making high-pitched whistling sounds when you breathe, most often when you breathe out (wheezing).  Prolonged cough (more than 14 days).  Coughing up blood.  A change in your usual mucus.  You have a stiff neck.  You have changes in your:  Vision.  Hearing.  Thinking.  Mood.  These symptoms may be an emergency. Get help right away. Call 911.  Do not wait to see if the symptoms will go away.  Do not drive yourself to the hospital.  Summary  An upper respiratory infection (URI) is a common infection of the nose, throat, and upper air passages that lead to the  lungs.  A URI is caused by a virus.  URIs usually get better on their own within 7-10 days.  Medicines cannot cure URIs, but your health care provider may recommend certain medicines to help relieve symptoms.  This information is not intended to replace advice given to you by your health care provider. Make sure you discuss any questions you have with your health care provider.  Document Revised: 07/20/2022 Document Reviewed: 07/20/2022  [employer] Patient Education c 2023 [employer]

## 2024-01-15 ENCOUNTER — TELEMEDICINE (OUTPATIENT)
Dept: FAMILY MEDICINE CLINIC | Facility: TELEHEALTH | Age: 44
End: 2024-01-15
Payer: OTHER GOVERNMENT

## 2024-01-15 DIAGNOSIS — R39.89 SUSPECTED UTI: Primary | ICD-10-CM

## 2024-01-15 RX ORDER — PHENAZOPYRIDINE HYDROCHLORIDE 200 MG/1
200 TABLET, FILM COATED ORAL 3 TIMES DAILY PRN
Qty: 6 TABLET | Refills: 0 | Status: SHIPPED | OUTPATIENT
Start: 2024-01-15 | End: 2024-01-17

## 2024-01-15 RX ORDER — NITROFURANTOIN 25; 75 MG/1; MG/1
100 CAPSULE ORAL 2 TIMES DAILY
Qty: 10 CAPSULE | Refills: 0 | Status: SHIPPED | OUTPATIENT
Start: 2024-01-15 | End: 2024-01-20

## 2024-01-15 NOTE — PATIENT INSTRUCTIONS
Drink plenty of water and avoid caffeine  If symptoms do not improve in 3-5 days follow up with your primary care provider or urgent care     Urinary Tract Infection, Adult  A urinary tract infection (UTI) is an infection of any part of the urinary tract. The urinary tract includes:  The kidneys.  The ureters.  The bladder.  The urethra.  These organs make, store, and get rid of pee (urine) in the body.  What are the causes?  This infection is caused by germs (bacteria) in your genital area. These germs grow and cause swelling (inflammation) of your urinary tract.  What increases the risk?  The following factors may make you more likely to develop this condition:  Using a small, thin tube (catheter) to drain pee.  Not being able to control when you pee or poop (incontinence).  Being female. If you are female, these things can increase the risk:  Using these methods to prevent pregnancy:  A medicine that kills sperm (spermicide).  A device that blocks sperm (diaphragm).  Having low levels of a female hormone (estrogen).  Being pregnant.  You are more likely to develop this condition if:  You have genes that add to your risk.  You are sexually active.  You take antibiotic medicines.  You have trouble peeing because of:  A prostate that is bigger than normal, if you are male.  A blockage in the part of your body that drains pee from the bladder.  A kidney stone.  A nerve condition that affects your bladder.  Not getting enough to drink.  Not peeing often enough.  You have other conditions, such as:  Diabetes.  A weak disease-fighting system (immune system).  Sickle cell disease.  Gout.  Injury of the spine.  What are the signs or symptoms?  Symptoms of this condition include:  Needing to pee right away.  Peeing small amounts often.  Pain or burning when peeing.  Blood in the pee.  Pee that smells bad or not like normal.  Trouble peeing.  Pee that is cloudy.  Fluid coming from the vagina, if you are female.  Pain in the  belly or lower back.  Other symptoms include:  Vomiting.  Not feeling hungry.  Feeling mixed up (confused). This may be the first symptom in older adults.  Being tired and grouchy (irritable).  A fever.  Watery poop (diarrhea).  How is this treated?  Taking antibiotic medicine.  Taking other medicines.  Drinking enough water.  In some cases, you may need to see a specialist.  Follow these instructions at home:    Medicines  Take over-the-counter and prescription medicines only as told by your doctor.  If you were prescribed an antibiotic medicine, take it as told by your doctor. Do not stop taking it even if you start to feel better.  General instructions  Make sure you:  Pee until your bladder is empty.  Do not hold pee for a long time.  Empty your bladder after sex.  Wipe from front to back after peeing or pooping if you are a female. Use each tissue one time when you wipe.  Drink enough fluid to keep your pee pale yellow.  Keep all follow-up visits.  Contact a doctor if:  You do not get better after 1-2 days.  Your symptoms go away and then come back.  Get help right away if:  You have very bad back pain.  You have very bad pain in your lower belly.  You have a fever.  You have chills.  You feeling like you will vomit or you vomit.  Summary  A urinary tract infection (UTI) is an infection of any part of the urinary tract.  This condition is caused by germs in your genital area.  There are many risk factors for a UTI.  Treatment includes antibiotic medicines.  Drink enough fluid to keep your pee pale yellow.  This information is not intended to replace advice given to you by your health care provider. Make sure you discuss any questions you have with your health care provider.  Document Revised: 07/30/2021 Document Reviewed: 07/30/2021  [employer] Patient Education © 2023 [employer]

## 2024-01-15 NOTE — PROGRESS NOTES
CHIEF COMPLAINT  Chief Complaint   Patient presents with    Urinary Tract Infection         HPI  Zaria Santacruz is a 43 y.o. female  presents with complaint of symptoms of UTI that started yesterday. Symptoms are exactly like previous UTIs    Review of Systems   Constitutional:  Negative for chills, diaphoresis, fatigue and fever.   Gastrointestinal:  Negative for nausea and vomiting.   Genitourinary:  Positive for dysuria, frequency and urgency. Negative for decreased urine volume, difficulty urinating, enuresis, flank pain, genital sores, hematuria, menstrual problem, pelvic pain, vaginal bleeding, vaginal discharge and vaginal pain.   Musculoskeletal:  Negative for back pain.       Past Medical History:   Diagnosis Date    Aftercare 2018    FOLLOWING EXCISION OF LEFT ANTEROLATERAL ANKLY CYST 2018    Aftercare 2018    FOLLOWING LEFT ANKLE CYST EXCISION    Mass of ankle 2018    Vein disorder        Family History   Problem Relation Age of Onset    Stroke Father     Skin cancer Maternal Grandmother     Heart disease Maternal Grandfather     Diabetes Maternal Grandfather     Heart disease Paternal Grandfather        Social History     Socioeconomic History    Marital status:    Tobacco Use    Smoking status: Former     Packs/day: 0.50     Years: 17.00     Additional pack years: 0.00     Total pack years: 8.50     Types: Cigarettes     Quit date:      Years since quittin.0     Passive exposure: Never    Smokeless tobacco: Never    Tobacco comments:     AGES [age]   Vaping Use    Vaping Use: Never used   Substance and Sexual Activity    Alcohol use: Yes     Alcohol/week: 0.0 standard drinks of alcohol     Comment: OCCASIONALLY    Drug use: Never    Sexual activity: Yes     Partners: Male     Birth control/protection: Surgical     Comment: full hysterctomy        Zaria Santacruz  reports that she quit smoking about 11 years ago. Her smoking use included cigarettes. She has a 8.50  pack-year smoking history. She has never been exposed to tobacco smoke. She has never used smokeless tobacco..      There were no vitals taken for this visit.    PHYSICAL EXAM  Physical Exam   Constitutional: She is oriented to person, place, and time. She appears well-developed and well-nourished. She does not have a sickly appearance. She does not appear ill. No distress.   HENT:   Head: Normocephalic and atraumatic.   Eyes: EOM are normal.   Neck: Neck normal appearance.  Pulmonary/Chest: Effort normal.  No respiratory distress.  Neurological: She is alert and oriented to person, place, and time.   Skin: Skin is dry.   Psychiatric: She has a normal mood and affect.           Diagnoses and all orders for this visit:    1. Suspected UTI (Primary)    Other orders  -     nitrofurantoin, macrocrystal-monohydrate, (Macrobid) 100 MG capsule; Take 1 capsule by mouth 2 (Two) Times a Day for 5 days.  Dispense: 10 capsule; Refill: 0  -     phenazopyridine (Pyridium) 200 MG tablet; Take 1 tablet by mouth 3 (Three) Times a Day As Needed for Bladder Spasms for up to 2 days.  Dispense: 6 tablet; Refill: 0        The use of a video visit has been reviewed with the patient and verbal informed consent has been obtained. Myself and Zaria Santacruz participated in this visit. The patient is located in [address] [place] [address]. I am located in [place], [place]. Mychart and Twilio were utilized.       Note Disclaimer: At [facility], we believe that sharing information builds trust and better   relationships. You are receiving this note because you recently visited [facility]. It is possible you   will see health information before a provider has talked with you about it. This kind of information can   be easy to misunderstand. To help you fully understand what it means for your health, we urge you to   discuss this note with your provider.    Samantha Blanchard, NABIL  01/15/2024  10:49 EST

## 2024-01-19 ENCOUNTER — TELEMEDICINE (OUTPATIENT)
Dept: FAMILY MEDICINE CLINIC | Facility: TELEHEALTH | Age: 44
End: 2024-01-19
Payer: OTHER GOVERNMENT

## 2024-01-19 DIAGNOSIS — B37.31 VAGINAL YEAST INFECTION: Primary | ICD-10-CM

## 2024-01-19 RX ORDER — FLUCONAZOLE 150 MG/1
TABLET ORAL
Qty: 2 TABLET | Refills: 0 | Status: SHIPPED | OUTPATIENT
Start: 2024-01-19

## 2024-01-19 NOTE — PROGRESS NOTES
Subjective   Chief Complaint   Patient presents with    Vaginal Itching       Zaria Santacruz is a 43 y.o. female.     History of Present Illness  Patient is currently being treated for a UTI with Macrobid.  She finishes antibiotics tomorrow.  She developed vaginal itching, burning and irritation yesterday.  She feels like she has a yeast infection from the antibiotics.  She reports urinary symptoms have resolved.  Vaginal Itching  The patient's primary symptoms include genital itching. The patient's pertinent negatives include no genital lesions, genital odor, genital rash, missed menses, pelvic pain, vaginal bleeding or vaginal discharge. This is a new problem. The current episode started yesterday. The problem occurs constantly. The problem has been unchanged. She is not pregnant. Pertinent negatives include no abdominal pain, anorexia, back pain, chills, constipation, diarrhea, discolored urine, dysuria, fever, flank pain, frequency, headaches, hematuria, joint pain, joint swelling, nausea, painful intercourse, rash, sore throat, urgency or vomiting. She has tried nothing for the symptoms.        Allergies   Allergen Reactions    Hydrocodone GI Intolerance       Past Medical History:   Diagnosis Date    Aftercare 2018    FOLLOWING EXCISION OF LEFT ANTEROLATERAL ANKLY CYST 2018    Aftercare 2018    FOLLOWING LEFT ANKLE CYST EXCISION    Mass of ankle 2018    Vein disorder        Past Surgical History:   Procedure Laterality Date    BARIATRIC SURGERY  2013    CYST REMOVAL Right     ANKLE    GASTRIC BYPASS      HYSTERECTOMY  2015    PARTIAL     KNEE ACL RECONSTRUCTION Left        Social History     Socioeconomic History    Marital status:    Tobacco Use    Smoking status: Former     Packs/day: 0.50     Years: 17.00     Additional pack years: 0.00     Total pack years: 8.50     Types: Cigarettes     Quit date:      Years since quittin.0     Passive exposure:  Never    Smokeless tobacco: Never    Tobacco comments:     AGES [age]   Vaping Use    Vaping Use: Never used   Substance and Sexual Activity    Alcohol use: Yes     Alcohol/week: 0.0 standard drinks of alcohol     Comment: OCCASIONALLY    Drug use: Never    Sexual activity: Yes     Partners: Male     Birth control/protection: Surgical     Comment: full hysterctomy        Family History   Problem Relation Age of Onset    Stroke Father     Skin cancer Maternal Grandmother     Heart disease Maternal Grandfather     Diabetes Maternal Grandfather     Heart disease Paternal Grandfather          Current Outpatient Medications:     fluconazole (Diflucan) 150 MG tablet, Take 1 tablet now and repeat in 3 days x 1., Disp: 2 tablet, Rfl: 0    nitrofurantoin, macrocrystal-monohydrate, (Macrobid) 100 MG capsule, Take 1 capsule by mouth 2 (Two) Times a Day for 5 days., Disp: 10 capsule, Rfl: 0      Review of Systems   Constitutional:  Negative for chills, diaphoresis, fatigue and fever.   HENT:  Negative for sore throat.    Gastrointestinal:  Negative for abdominal pain, anorexia, constipation, diarrhea, nausea and vomiting.   Genitourinary:  Positive for vaginal pain. Negative for dysuria, flank pain, frequency, hematuria, missed menses, pelvic pain, urgency, vaginal bleeding and vaginal discharge.   Musculoskeletal:  Negative for back pain and joint pain.   Skin:  Negative for rash.        There were no vitals filed for this visit.    Objective   Physical Exam  Constitutional:       General: She is not in acute distress.     Appearance: Normal appearance. She is not ill-appearing, toxic-appearing or diaphoretic.   HENT:      Head: Normocephalic.      Mouth/Throat:      Lips: Pink.      Mouth: Mucous membranes are moist.   Pulmonary:      Effort: Pulmonary effort is normal.   Abdominal:      Tenderness: There is no abdominal tenderness (per pt).   Neurological:      Mental Status: She is alert and oriented to person, place, and  time.          Procedures     Assessment & Plan   Diagnoses and all orders for this visit:    1. Vaginal yeast infection (Primary)  -     fluconazole (Diflucan) 150 MG tablet; Take 1 tablet now and repeat in 3 days x 1.  Dispense: 2 tablet; Refill: 0            PLAN: Discussed dosing, side effects, recommended other symptomatic care.  Patient should follow up with primary care provider, Urgent Care or ER if symptoms worsen, fail to resolve or other symptoms need attention. Patient/family agree to the above.         NABIL Bo     The use of a video visit has been reviewed with the patient and verbal informed consent has been obtained. Myself and Zaria Santacruz participated in this visit. The patient is located at [address] [place] [address]. I am located in [place], [place]. Mychart and Zoom were utilized.        This visit was performed via Telehealth.  This patient has been instructed to follow-up with their primary care provider if their symptoms worsen or the treatment provided does not resolve their illness.

## 2024-03-30 ENCOUNTER — APPOINTMENT (OUTPATIENT)
Dept: ULTRASOUND IMAGING | Facility: HOSPITAL | Age: 44
End: 2024-03-30
Payer: OTHER GOVERNMENT

## 2024-03-30 ENCOUNTER — HOSPITAL ENCOUNTER (EMERGENCY)
Facility: HOSPITAL | Age: 44
Discharge: HOME | End: 2024-03-30
Attending: EMERGENCY MEDICINE
Payer: OTHER GOVERNMENT

## 2024-03-30 VITALS
HEART RATE: 64 BPM | TEMPERATURE: 98.2 F | RESPIRATION RATE: 16 BRPM | DIASTOLIC BLOOD PRESSURE: 71 MMHG | OXYGEN SATURATION: 98 % | BODY MASS INDEX: 36.69 KG/M2 | HEIGHT: 65 IN | WEIGHT: 220.24 LBS | SYSTOLIC BLOOD PRESSURE: 107 MMHG

## 2024-03-30 DIAGNOSIS — R10.11 RIGHT UPPER QUADRANT ABDOMINAL PAIN: Primary | ICD-10-CM

## 2024-03-30 LAB
ALBUMIN SERPL-MCNC: 3.6 G/DL (ref 3.5–5.2)
ALBUMIN/GLOB SERPL: 1.2 G/DL
ALP SERPL-CCNC: 54 U/L (ref 39–117)
ALT SERPL W P-5'-P-CCNC: 8 U/L (ref 1–33)
ANION GAP SERPL CALCULATED.3IONS-SCNC: 10.6 MMOL/L (ref 5–15)
AST SERPL-CCNC: 11 U/L (ref 1–32)
BASOPHILS # BLD AUTO: 0.08 10*3/MM3 (ref 0–0.2)
BASOPHILS NFR BLD AUTO: 0.9 % (ref 0–1.5)
BILIRUB SERPL-MCNC: 0.2 MG/DL (ref 0–1.2)
BUN SERPL-MCNC: 13 MG/DL (ref 6–20)
BUN/CREAT SERPL: 17.3 (ref 7–25)
CALCIUM SPEC-SCNC: 8.7 MG/DL (ref 8.6–10.5)
CHLORIDE SERPL-SCNC: 104 MMOL/L (ref 98–107)
CO2 SERPL-SCNC: 25.4 MMOL/L (ref 22–29)
CREAT SERPL-MCNC: 0.75 MG/DL (ref 0.57–1)
DEPRECATED RDW RBC AUTO: 42 FL (ref 37–54)
EGFRCR SERPLBLD CKD-EPI 2021: 100.2 ML/MIN/1.73
EOSINOPHIL # BLD AUTO: 0.18 10*3/MM3 (ref 0–0.4)
EOSINOPHIL NFR BLD AUTO: 2.1 % (ref 0.3–6.2)
ERYTHROCYTE [DISTWIDTH] IN BLOOD BY AUTOMATED COUNT: 12.5 % (ref 12.3–15.4)
GLOBULIN UR ELPH-MCNC: 2.9 GM/DL
GLUCOSE SERPL-MCNC: 91 MG/DL (ref 65–99)
HCT VFR BLD AUTO: 38.4 % (ref 34–46.6)
HGB BLD-MCNC: 12.1 G/DL (ref 12–15.9)
HOLD SPECIMEN: NORMAL
HOLD SPECIMEN: NORMAL
IMM GRANULOCYTES # BLD AUTO: 0.03 10*3/MM3 (ref 0–0.05)
IMM GRANULOCYTES NFR BLD AUTO: 0.3 % (ref 0–0.5)
LIPASE SERPL-CCNC: 26 U/L (ref 13–60)
LYMPHOCYTES # BLD AUTO: 3.2 10*3/MM3 (ref 0.7–3.1)
LYMPHOCYTES NFR BLD AUTO: 36.8 % (ref 19.6–45.3)
MCH RBC QN AUTO: 29.1 PG (ref 26.6–33)
MCHC RBC AUTO-ENTMCNC: 31.5 G/DL (ref 31.5–35.7)
MCV RBC AUTO: 92.3 FL (ref 79–97)
MONOCYTES # BLD AUTO: 0.71 10*3/MM3 (ref 0.1–0.9)
MONOCYTES NFR BLD AUTO: 8.2 % (ref 5–12)
NEUTROPHILS NFR BLD AUTO: 4.49 10*3/MM3 (ref 1.7–7)
NEUTROPHILS NFR BLD AUTO: 51.7 % (ref 42.7–76)
NRBC BLD AUTO-RTO: 0 /100 WBC (ref 0–0.2)
PLATELET # BLD AUTO: 266 10*3/MM3 (ref 140–450)
PMV BLD AUTO: 9.7 FL (ref 6–12)
POTASSIUM SERPL-SCNC: 4.3 MMOL/L (ref 3.5–5.2)
PROT SERPL-MCNC: 6.5 G/DL (ref 6–8.5)
RBC # BLD AUTO: 4.16 10*6/MM3 (ref 3.77–5.28)
SODIUM SERPL-SCNC: 140 MMOL/L (ref 136–145)
WBC NRBC COR # BLD AUTO: 8.69 10*3/MM3 (ref 3.4–10.8)
WHOLE BLOOD HOLD COAG: NORMAL
WHOLE BLOOD HOLD SPECIMEN: NORMAL

## 2024-03-30 PROCEDURE — 25010000002 DICYCLOMINE PER 20 MG: Performed by: NURSE PRACTITIONER

## 2024-03-30 PROCEDURE — 76705 ECHO EXAM OF ABDOMEN: CPT

## 2024-03-30 PROCEDURE — 96374 THER/PROPH/DIAG INJ IV PUSH: CPT

## 2024-03-30 PROCEDURE — 85025 COMPLETE CBC W/AUTO DIFF WBC: CPT | Performed by: EMERGENCY MEDICINE

## 2024-03-30 PROCEDURE — 25810000003 SODIUM CHLORIDE 0.9 % SOLUTION: Performed by: NURSE PRACTITIONER

## 2024-03-30 PROCEDURE — 80053 COMPREHEN METABOLIC PANEL: CPT | Performed by: EMERGENCY MEDICINE

## 2024-03-30 PROCEDURE — 96372 THER/PROPH/DIAG INJ SC/IM: CPT

## 2024-03-30 PROCEDURE — 25010000002 ONDANSETRON PER 1 MG: Performed by: NURSE PRACTITIONER

## 2024-03-30 PROCEDURE — 83690 ASSAY OF LIPASE: CPT | Performed by: EMERGENCY MEDICINE

## 2024-03-30 PROCEDURE — 93005 ELECTROCARDIOGRAM TRACING: CPT | Performed by: EMERGENCY MEDICINE

## 2024-03-30 PROCEDURE — 96361 HYDRATE IV INFUSION ADD-ON: CPT

## 2024-03-30 PROCEDURE — 25010000002 KETOROLAC TROMETHAMINE PER 15 MG: Performed by: NURSE PRACTITIONER

## 2024-03-30 PROCEDURE — 96375 TX/PRO/DX INJ NEW DRUG ADDON: CPT

## 2024-03-30 PROCEDURE — 99284 EMERGENCY DEPT VISIT MOD MDM: CPT

## 2024-03-30 RX ORDER — SODIUM CHLORIDE 0.9 % (FLUSH) 0.9 %
10 SYRINGE (ML) INJECTION AS NEEDED
Status: DISCONTINUED | OUTPATIENT
Start: 2024-03-30 | End: 2024-03-30 | Stop reason: HOSPADM

## 2024-03-30 RX ORDER — ONDANSETRON 2 MG/ML
4 INJECTION INTRAMUSCULAR; INTRAVENOUS ONCE
Status: COMPLETED | OUTPATIENT
Start: 2024-03-30 | End: 2024-03-30

## 2024-03-30 RX ORDER — DICYCLOMINE HCL 20 MG
20 TABLET ORAL EVERY 6 HOURS
Qty: 20 TABLET | Refills: 0 | Status: SHIPPED | OUTPATIENT
Start: 2024-03-30

## 2024-03-30 RX ORDER — KETOROLAC TROMETHAMINE 15 MG/ML
15 INJECTION, SOLUTION INTRAMUSCULAR; INTRAVENOUS ONCE
Status: COMPLETED | OUTPATIENT
Start: 2024-03-30 | End: 2024-03-30

## 2024-03-30 RX ORDER — DICYCLOMINE HYDROCHLORIDE 10 MG/ML
20 INJECTION INTRAMUSCULAR ONCE
Status: COMPLETED | OUTPATIENT
Start: 2024-03-30 | End: 2024-03-30

## 2024-03-30 RX ORDER — ONDANSETRON 4 MG/1
4 TABLET, ORALLY DISINTEGRATING ORAL EVERY 8 HOURS PRN
Qty: 15 TABLET | Refills: 0 | Status: SHIPPED | OUTPATIENT
Start: 2024-03-30

## 2024-03-30 RX ADMIN — ONDANSETRON 4 MG: 2 INJECTION INTRAMUSCULAR; INTRAVENOUS at 14:59

## 2024-03-30 RX ADMIN — SODIUM CHLORIDE 1000 ML: 9 INJECTION, SOLUTION INTRAVENOUS at 14:59

## 2024-03-30 RX ADMIN — DICYCLOMINE HYDROCHLORIDE 20 MG: 20 INJECTION, SOLUTION INTRAMUSCULAR at 15:00

## 2024-03-30 RX ADMIN — KETOROLAC TROMETHAMINE 15 MG: 15 INJECTION, SOLUTION INTRAMUSCULAR; INTRAVENOUS at 14:59

## 2024-03-30 NOTE — DISCHARGE INSTRUCTIONS
Please follow-up with primary care provider as needed.  Return to the ED for worsening abdominal pain, fevers greater than 100.4, intractable vomiting.

## 2024-03-30 NOTE — ED NOTES
Went to reassess pt after downtrending bp reading, Pt states her nausea and pain are improved after medication. Pt states her arm was above her head the last several readings.

## 2024-03-30 NOTE — ED PROVIDER NOTES
Time: 1:40 PM EDT  Date of encounter:  3/30/2024  Independent Historian/Clinical History and Information was obtained by:   Patient    History is limited by: N/A    Chief Complaint: epigastric pain, nausea      History of Present Illness:  Patient is a 43 y.o. year old female who presents to the emergency department for evaluation of epigastric pain and nausea onset today, but says she had a similar episode earlier this week. She is nauseated with the pain when it occurs, but denies vomiting or diarrhea.    HPI    Patient Care Team  Primary Care Provider: Joanna Faulkner APRN    Past Medical History:     Allergies   Allergen Reactions    Hydrocodone GI Intolerance     Past Medical History:   Diagnosis Date    Aftercare 04/23/2018    FOLLOWING EXCISION OF LEFT ANTEROLATERAL ANKLY CYST 4/11/2018    Aftercare 04/27/2018    FOLLOWING LEFT ANKLE CYST EXCISION    Mass of ankle 03/29/2018    Vein disorder      Past Surgical History:   Procedure Laterality Date    BARIATRIC SURGERY  November 2013    CYST REMOVAL Right 1998    ANKLE    GASTRIC BYPASS  2013    HYSTERECTOMY  2015    PARTIAL     KNEE ACL RECONSTRUCTION Left 1999     Family History   Problem Relation Age of Onset    Stroke Father     Skin cancer Maternal Grandmother     Heart disease Maternal Grandfather     Diabetes Maternal Grandfather     Heart disease Paternal Grandfather        Home Medications:  Prior to Admission medications    Medication Sig Start Date End Date Taking? Authorizing Provider   ondansetron ODT (ZOFRAN-ODT) 4 MG disintegrating tablet Place 1 tablet on the tongue Every 8 (Eight) Hours As Needed for Nausea or Vomiting for up to 15 doses. 2/26/24   Meme Longo APRN   rizatriptan (Maxalt) 10 MG tablet Take 1 tablet by mouth 1 (One) Time As Needed for Migraine (May repeat dose if migraine is not resolved in 2 hrs) for up to 8 doses. May repeat in 2 hours if needed 2/26/24   Meme Longo APRN        Social History:   Social History  "    Tobacco Use    Smoking status: Former     Current packs/day: 0.00     Average packs/day: 0.5 packs/day for 17.0 years (8.5 ttl pk-yrs)     Types: Cigarettes     Start date:      Quit date:      Years since quittin.2     Passive exposure: Never    Smokeless tobacco: Never    Tobacco comments:     AGES [age]   Vaping Use    Vaping status: Never Used   Substance Use Topics    Alcohol use: Yes     Alcohol/week: 0.0 standard drinks of alcohol     Comment: OCCASIONALLY    Drug use: Never         Review of Systems:  Review of Systems   Constitutional: Negative.    HENT: Negative.     Eyes: Negative.    Respiratory: Negative.     Cardiovascular: Negative.    Gastrointestinal:  Positive for abdominal pain and nausea.   Endocrine: Negative.    Genitourinary: Negative.    Musculoskeletal: Negative.    Skin: Negative.    Allergic/Immunologic: Negative.    Neurological: Negative.    Hematological: Negative.    Psychiatric/Behavioral: Negative.          Physical Exam:  /71 (BP Location: Right arm, Patient Position: Sitting)   Pulse 56   Temp 98.2 °F (36.8 °C) (Oral)   Resp 16   Ht 165.1 cm (65\")   Wt 99.9 kg (220 lb 3.8 oz)   SpO2 96%   BMI 36.65 kg/m²     Physical Exam  Constitutional:       Appearance: Normal appearance.   HENT:      Head: Normocephalic and atraumatic.      Nose: Nose normal.      Mouth/Throat:      Mouth: Mucous membranes are moist.   Eyes:      Pupils: Pupils are equal, round, and reactive to light.   Cardiovascular:      Rate and Rhythm: Normal rate and regular rhythm.      Pulses: Normal pulses.   Pulmonary:      Effort: Pulmonary effort is normal.      Breath sounds: Normal breath sounds.   Abdominal:      General: Abdomen is flat. Bowel sounds are normal.      Palpations: Abdomen is soft.      Tenderness: There is abdominal tenderness in the right upper quadrant and epigastric area. Positive signs include Boudreaux's sign.   Musculoskeletal:         General: Normal range of " motion.      Cervical back: Normal range of motion.   Skin:     General: Skin is warm and dry.      Capillary Refill: Capillary refill takes less than 2 seconds.   Neurological:      General: No focal deficit present.      Mental Status: She is alert and oriented to person, place, and time. Mental status is at baseline.   Psychiatric:         Mood and Affect: Mood normal.                  Procedures:  Procedures      Medical Decision Making:      Comorbidities that affect care:    History of gastric bypass    External Notes reviewed:    None      The following orders were placed and all results were independently analyzed by me:  Orders Placed This Encounter   Procedures    US Gallbladder    [place] Draw    Comprehensive Metabolic Panel    Lipase    Urinalysis With Microscopic If Indicated (No Culture) - Urine, Clean Catch    CBC Auto Differential    NPO Diet NPO Type: Strict NPO    Undress & Gown    Continuous Pulse Oximetry    Vital Signs    Oxygen Therapy- Nasal Cannula; Titrate 1-6 LPM Per SpO2; 90 - 95%    ECG 12 Lead ED Triage Standing Order; Abdominal Pain (Upper)    Insert Peripheral IV    CBC & Differential    Green Top (Gel)    Lavender Top    Gold Top - SST    Light Blue Top       Medications Given in the Emergency Department:  Medications   sodium chloride 0.9 % flush 10 mL (has no administration in time range)   dicyclomine (BENTYL) injection 20 mg (20 mg Intramuscular Given 3/30/24 1500)   sodium chloride 0.9 % bolus 1,000 mL (1,000 mL Intravenous New Bag 3/30/24 1459)   ondansetron (ZOFRAN) injection 4 mg (4 mg Intravenous Given 3/30/24 1459)   ketorolac (TORADOL) injection 15 mg (15 mg Intravenous Given 3/30/24 1459)        ED Course:    ED Course as of 03/30/24 1752   Sat Mar 30, 2024   1750 Patient reports no urinary symptoms and states that she is ready for discharge and she does not wish to wait for urinalysis to be collected and resulted.  Patient counseled on risks and benefits of waiting for  the test to be completed.  Counseled on following up with primary care physician for further symptoms. [CE]      ED Course User Index  [CE] Lola Fuller APRN       Labs:    Lab Results (last 24 hours)       Procedure Component Value Units Date/Time    CBC & Differential [[id number]]  (Abnormal) Collected: 03/30/24 1446    Specimen: Blood Updated: 03/30/24 1450    Narrative:      The following orders were created for panel order CBC & Differential.  Procedure                               Abnormality         Status                     ---------                               -----------         ------                     CBC Auto Differential[[id number]]        Abnormal            Final result                 Please view results for these tests on the individual orders.    Comprehensive Metabolic Panel [[id number]] Collected: 03/30/24 1446    Specimen: Blood Updated: 03/30/24 1517     Glucose 91 mg/dL      BUN 13 mg/dL      Creatinine 0.75 mg/dL      Sodium 140 mmol/L      Potassium 4.3 mmol/L      Chloride 104 mmol/L      CO2 25.4 mmol/L      Calcium 8.7 mg/dL      Total Protein 6.5 g/dL      Albumin 3.6 g/dL      ALT (SGPT) 8 U/L      AST (SGOT) 11 U/L      Alkaline Phosphatase 54 U/L      Total Bilirubin 0.2 mg/dL      Globulin 2.9 gm/dL      A/G Ratio 1.2 g/dL      BUN/Creatinine Ratio 17.3     Anion Gap 10.6 mmol/L      eGFR 100.2 mL/min/1.73     Narrative:      GFR Normal >60  Chronic Kidney Disease <60  Kidney Failure <15      Lipase [[id number]]  (Normal) Collected: 03/30/24 1446    Specimen: Blood Updated: 03/30/24 1506     Lipase 26 U/L     CBC Auto Differential [[id number]]  (Abnormal) Collected: 03/30/24 1446    Specimen: Blood Updated: 03/30/24 1450     WBC 8.69 10*3/mm3      RBC 4.16 10*6/mm3      Hemoglobin 12.1 g/dL      Hematocrit 38.4 %      MCV 92.3 fL      MCH 29.1 pg      MCHC 31.5 g/dL      RDW 12.5 %      RDW-SD 42.0 fl      MPV 9.7 fL      Platelets 266 10*3/mm3      Neutrophil % 51.7 %       Lymphocyte % 36.8 %      Monocyte % 8.2 %      Eosinophil % 2.1 %      Basophil % 0.9 %      Immature Grans % 0.3 %      Neutrophils, Absolute 4.49 10*3/mm3      Lymphocytes, Absolute 3.20 10*3/mm3      Monocytes, Absolute 0.71 10*3/mm3      Eosinophils, Absolute 0.18 10*3/mm3      Basophils, Absolute 0.08 10*3/mm3      Immature Grans, Absolute 0.03 10*3/mm3      nRBC 0.0 /100 WBC              Imaging:    US Gallbladder    Result Date: 3/30/2024  US GALLBLADDER-  Date of Exam: 3/30/2024 3:16 PM  Indication: RUQ pain.  Comparison: None available.  Technique: Right upper quadrant ultrasound  FINDINGS:  The pancreas is sonographically normal. The liver appears normal in contour and echotexture, with no focal mass identified. The hepatic vasculature appears within normal limits. The gallbladder is sonographically normal, with no stones, wall thickening, pericholecystic fluid, or sonographic Boudreaux's sign. The common bile duct is normal in caliber measuring 2.7 mm. The right kidney is normal in size and echogenicity, measuring 10.1 cm, with no stone, mass, or hydronephrosis identified.      Examination appears within normal limits.  Electronically Signed By-Chico Chi MD On:3/30/2024 4:09 PM         Differential Diagnosis and Discussion:    Abdominal Pain: Based on the patient's signs and symptoms, I considered abdominal aortic aneurysm, small bowel obstruction, pancreatitis, acute cholecystitis, acute appendecitis, peptic ulcer disease, gastritis, colitis, endocrine disorders, irritable bowel syndrome and other differential diagnosis an etiology of the patient's abdominal pain.    All labs were reviewed and interpreted by me.  Ultrasound impression was interpreted by me.     MDM     Amount and/or Complexity of Data Reviewed  Clinical lab tests: reviewed  Tests in the radiology section of CPT®: reviewed  Tests in the medicine section of CPT®: reviewed           Patient Care Considerations:    ANTIBIOTICS: I  considered prescribing antibiotics as an outpatient however no bacterial focus of infection was found.      Consultants/Shared Management Plan:    None    Social Determinants of Health:    Patient is independent, reliable, and has access to care.       Disposition and Care Coordination:    Discharged: The patient is suitable and stable for discharge with no need for consideration of admission.    I have explained the patient´s condition, diagnoses and treatment plan based on the information available to me at this time. I have answered questions and addressed any concerns. The patient has a good  understanding of the patient´s diagnosis, condition, and treatment plan as can be expected at this point. The vital signs have been stable. The patient´s condition is stable and appropriate for discharge from the emergency department.      The patient will pursue further outpatient evaluation with the primary care physician or other designated or consulting physician as outlined in the discharge instructions. They are agreeable to this plan of care and follow-up instructions have been explained in detail. The patient has received these instructions in written format and has expressed an understanding of the discharge instructions. The patient is aware that any significant change in condition or worsening of symptoms should prompt an immediate return to this or the closest emergency department or call to 911.  I have explained discharge medications and the need for follow up with the patient/caretakers. This was also printed in the discharge instructions. Patient was discharged with the following medications and follow up:      Medication List        New Prescriptions      dicyclomine 20 MG tablet  Commonly known as: BENTYL  Take 1 tablet by mouth Every 6 (Six) Hours.     ondansetron ODT 4 MG disintegrating tablet  Commonly known as: ZOFRAN-ODT  Place 1 tablet on the tongue Every 8 (Eight) Hours As Needed for Nausea or  Vomiting.               Where to Get Your Medications        These medications were sent to [id number] - [place], [place] - [address] AT [facility] [address] [facility] & [place] - [phone] PH - [phone] FX  [address] [place] [address]      Phone: [phone]   dicyclomine 20 MG tablet  ondansetron ODT 4 MG disintegrating tablet      Joanna Faulkner, APRN  [address]  [address]  [facility] [address] [phone]    Go to   As needed       Final diagnoses:   Right upper quadrant abdominal pain        ED Disposition       ED Disposition   Discharge    Condition   Stable    Comment   --               This medical record created using voice recognition software.             Lola Fuller, APRN  03/30/24 [id number]

## 2024-03-31 LAB
QT INTERVAL: 401 MS
QTC INTERVAL: 411 MS

## 2024-04-09 ENCOUNTER — OFFICE VISIT (OUTPATIENT)
Dept: FAMILY MEDICINE CLINIC | Facility: CLINIC | Age: 44
End: 2024-04-09
Payer: OTHER GOVERNMENT

## 2024-04-09 ENCOUNTER — LAB (OUTPATIENT)
Dept: LAB | Facility: HOSPITAL | Age: 44
End: 2024-04-09
Payer: OTHER GOVERNMENT

## 2024-04-09 VITALS
BODY MASS INDEX: 37.15 KG/M2 | DIASTOLIC BLOOD PRESSURE: 67 MMHG | HEART RATE: 64 BPM | WEIGHT: 223 LBS | SYSTOLIC BLOOD PRESSURE: 107 MMHG | HEIGHT: 65 IN | OXYGEN SATURATION: 98 %

## 2024-04-09 DIAGNOSIS — G43.801 OTHER MIGRAINE WITH STATUS MIGRAINOSUS, NOT INTRACTABLE: ICD-10-CM

## 2024-04-09 DIAGNOSIS — Z12.11 SCREENING FOR COLON CANCER: ICD-10-CM

## 2024-04-09 DIAGNOSIS — R10.9 ABDOMINAL CRAMPING: ICD-10-CM

## 2024-04-09 DIAGNOSIS — Z13.29 SCREENING FOR THYROID DISORDER: ICD-10-CM

## 2024-04-09 DIAGNOSIS — Z13.220 SCREENING FOR CHOLESTEROL LEVEL: ICD-10-CM

## 2024-04-09 DIAGNOSIS — Z12.31 ENCOUNTER FOR SCREENING MAMMOGRAM FOR MALIGNANT NEOPLASM OF BREAST: ICD-10-CM

## 2024-04-09 DIAGNOSIS — Z23 NEED FOR TDAP VACCINATION: ICD-10-CM

## 2024-04-09 DIAGNOSIS — Z11.59 NEED FOR HEPATITIS C SCREENING TEST: ICD-10-CM

## 2024-04-09 DIAGNOSIS — Z00.00 ANNUAL PHYSICAL EXAM: Primary | ICD-10-CM

## 2024-04-09 LAB
ALBUMIN SERPL-MCNC: 4 G/DL (ref 3.5–5.2)
ALBUMIN/GLOB SERPL: 1.4 G/DL
ALP SERPL-CCNC: 56 U/L (ref 39–117)
ALT SERPL W P-5'-P-CCNC: 10 U/L (ref 1–33)
ANION GAP SERPL CALCULATED.3IONS-SCNC: 9.8 MMOL/L (ref 5–15)
AST SERPL-CCNC: 14 U/L (ref 1–32)
BASOPHILS # BLD AUTO: 0.08 10*3/MM3 (ref 0–0.2)
BASOPHILS NFR BLD AUTO: 1.2 % (ref 0–1.5)
BILIRUB SERPL-MCNC: 0.6 MG/DL (ref 0–1.2)
BUN SERPL-MCNC: 14 MG/DL (ref 6–20)
BUN/CREAT SERPL: 19.2 (ref 7–25)
CALCIUM SPEC-SCNC: 9.5 MG/DL (ref 8.6–10.5)
CHLORIDE SERPL-SCNC: 102 MMOL/L (ref 98–107)
CHOLEST SERPL-MCNC: 163 MG/DL (ref 0–200)
CO2 SERPL-SCNC: 25.2 MMOL/L (ref 22–29)
CREAT SERPL-MCNC: 0.73 MG/DL (ref 0.57–1)
DEPRECATED RDW RBC AUTO: 41.1 FL (ref 37–54)
EGFRCR SERPLBLD CKD-EPI 2021: 103.5 ML/MIN/1.73
EOSINOPHIL # BLD AUTO: 0.13 10*3/MM3 (ref 0–0.4)
EOSINOPHIL NFR BLD AUTO: 2 % (ref 0.3–6.2)
ERYTHROCYTE [DISTWIDTH] IN BLOOD BY AUTOMATED COUNT: 12.4 % (ref 12.3–15.4)
GLOBULIN UR ELPH-MCNC: 2.9 GM/DL
GLUCOSE SERPL-MCNC: 89 MG/DL (ref 65–99)
HAV IGM SERPL QL IA: NORMAL
HBV CORE IGM SERPL QL IA: NORMAL
HBV SURFACE AG SERPL QL IA: NORMAL
HCT VFR BLD AUTO: 37.9 % (ref 34–46.6)
HCV AB SER DONR QL: NORMAL
HDLC SERPL-MCNC: 56 MG/DL (ref 40–60)
HGB BLD-MCNC: 12.3 G/DL (ref 12–15.9)
IMM GRANULOCYTES # BLD AUTO: 0.01 10*3/MM3 (ref 0–0.05)
IMM GRANULOCYTES NFR BLD AUTO: 0.2 % (ref 0–0.5)
LDLC SERPL CALC-MCNC: 94 MG/DL (ref 0–100)
LDLC/HDLC SERPL: 1.67 {RATIO}
LYMPHOCYTES # BLD AUTO: 2.19 10*3/MM3 (ref 0.7–3.1)
LYMPHOCYTES NFR BLD AUTO: 33.1 % (ref 19.6–45.3)
MCH RBC QN AUTO: 29.1 PG (ref 26.6–33)
MCHC RBC AUTO-ENTMCNC: 32.5 G/DL (ref 31.5–35.7)
MCV RBC AUTO: 89.6 FL (ref 79–97)
MONOCYTES # BLD AUTO: 0.53 10*3/MM3 (ref 0.1–0.9)
MONOCYTES NFR BLD AUTO: 8 % (ref 5–12)
NEUTROPHILS NFR BLD AUTO: 3.68 10*3/MM3 (ref 1.7–7)
NEUTROPHILS NFR BLD AUTO: 55.5 % (ref 42.7–76)
NRBC BLD AUTO-RTO: 0 /100 WBC (ref 0–0.2)
PLATELET # BLD AUTO: 300 10*3/MM3 (ref 140–450)
PMV BLD AUTO: 11 FL (ref 6–12)
POTASSIUM SERPL-SCNC: 3.9 MMOL/L (ref 3.5–5.2)
PROT SERPL-MCNC: 6.9 G/DL (ref 6–8.5)
RBC # BLD AUTO: 4.23 10*6/MM3 (ref 3.77–5.28)
SODIUM SERPL-SCNC: 137 MMOL/L (ref 136–145)
TRIGL SERPL-MCNC: 67 MG/DL (ref 0–150)
TSH SERPL DL<=0.05 MIU/L-ACNC: 2.12 UIU/ML (ref 0.27–4.2)
VLDLC SERPL-MCNC: 13 MG/DL (ref 5–40)
WBC NRBC COR # BLD AUTO: 6.62 10*3/MM3 (ref 3.4–10.8)

## 2024-04-09 PROCEDURE — 80061 LIPID PANEL: CPT

## 2024-04-09 PROCEDURE — 36415 COLL VENOUS BLD VENIPUNCTURE: CPT

## 2024-04-09 PROCEDURE — 80074 ACUTE HEPATITIS PANEL: CPT

## 2024-04-09 PROCEDURE — 84443 ASSAY THYROID STIM HORMONE: CPT

## 2024-04-09 PROCEDURE — 99396 PREV VISIT EST AGE 40-64: CPT | Performed by: NURSE PRACTITIONER

## 2024-04-09 PROCEDURE — 80053 COMPREHEN METABOLIC PANEL: CPT

## 2024-04-09 PROCEDURE — 85025 COMPLETE CBC W/AUTO DIFF WBC: CPT

## 2024-04-09 NOTE — PROGRESS NOTES
Chief Complaint  Abdominal Cramping and Annual Exam, migraine    Subjective            Zaria Santacruz presents to [facility]  History of Present Illness  PT is an annual CPE.  And f/u migraines, doing well on maxalt prn.  Pt was recently seen in ER on 3/30 for abdominal discomfort. Pt had gallbladder U/S done and was WNL. Pt was advised to f/u with PCP for additional testing for gallbladder. Pt states there are times she is in severe pain after eating and has some nausea at times. Pt is upper mid abdomen, under ribs. Pt states the pain will travel to her back at times. Pt was given zofran and bentyl for symptoms and states this does helps some.     Pt is due mammogram/orders placed.    Pt is due colon screening/orders placed.     PT has labs pending she will get drawn.    PAP: pt had a hysterectomy    Pt is due Tdap vaccine. Pt will come in and get as a walk in, not available in clinic today.        Past Medical History:   Diagnosis Date    Aftercare 2018    FOLLOWING EXCISION OF LEFT ANTEROLATERAL ANKLY CYST 2018    Aftercare 2018    FOLLOWING LEFT ANKLE CYST EXCISION    Mass of ankle 2018    Vein disorder        Allergies   Allergen Reactions    Hydrocodone GI Intolerance        Past Surgical History:   Procedure Laterality Date    BARIATRIC SURGERY  2013    CYST REMOVAL Right     ANKLE    GASTRIC BYPASS      HYSTERECTOMY  2015    PARTIAL     KNEE ACL RECONSTRUCTION Left         Social History     Tobacco Use    Smoking status: Former     Current packs/day: 0.00     Average packs/day: 0.5 packs/day for 17.0 years (8.5 ttl pk-yrs)     Types: Cigarettes     Start date:      Quit date: 2013     Years since quittin.2     Passive exposure: Never    Smokeless tobacco: Never    Tobacco comments:     AGES [age]   Substance Use Topics    Alcohol use: Yes     Alcohol/week: 0.0 standard drinks of alcohol     Comment: OCCASIONALLY  "      Family History   Problem Relation Age of Onset    Stroke Father     Skin cancer Maternal Grandmother     Heart disease Maternal Grandfather     Diabetes Maternal Grandfather     Heart disease Paternal Grandfather         Current Outpatient Medications on File Prior to Visit   Medication Sig    dicyclomine (BENTYL) 20 MG tablet Take 1 tablet by mouth Every 6 (Six) Hours.    ondansetron ODT (ZOFRAN-ODT) 4 MG disintegrating tablet Place 1 tablet on the tongue Every 8 (Eight) Hours As Needed for Nausea or Vomiting.    rizatriptan (Maxalt) 10 MG tablet Take 1 tablet by mouth 1 (One) Time As Needed for Migraine (May repeat dose if migraine is not resolved in 2 hrs) for up to 8 doses. May repeat in 2 hours if needed     No current facility-administered medications on file prior to visit.       Health Maintenance Due   Topic Date Due    COLORECTAL CANCER SCREENING  Never done    TDAP/TD VACCINES (2 - Td or Tdap) 01/01/2020    HEPATITIS C SCREENING  Never done    ANNUAL PHYSICAL  Never done       Objective     /67   Pulse 64   Ht 165.1 cm (65\")   Wt 101 kg (223 lb)   SpO2 98%   BMI 37.11 kg/m²       Physical Exam  Constitutional:       General: She is not in acute distress.     Appearance: Normal appearance. She is not ill-appearing.   HENT:      Head: Normocephalic and atraumatic.      Right Ear: Tympanic membrane, ear canal and external ear normal.      Left Ear: Tympanic membrane, ear canal and external ear normal.      Nose: Nose normal.   Cardiovascular:      Rate and Rhythm: Normal rate and regular rhythm.      Heart sounds: Normal heart sounds. No murmur heard.  Pulmonary:      Effort: Pulmonary effort is normal. No respiratory distress.      Breath sounds: Normal breath sounds.   Chest:      Chest wall: No tenderness.   Abdominal:      General: Abdomen is flat. Bowel sounds are normal. There is no distension.      Palpations: Abdomen is soft. There is no mass.      Tenderness: There is generalized no " abdominal tenderness. There is no guarding.   Musculoskeletal:         General: No swelling or tenderness. Normal range of motion.      Cervical back: Normal range of motion and neck supple.   Skin:     General: Skin is warm and dry.      Findings: No rash.   Neurological:      General: No focal deficit present.      Mental Status: She is alert and oriented to person, place, and time. Mental status is at baseline.      Gait: Gait normal.   Psychiatric:         Mood and Affect: Mood normal.         Behavior: Behavior normal.         Thought Content: Thought content normal.         Judgment: Judgment normal.         Class 2 Severe Obesity (BMI >=35 and <=39.9). Obesity-related health conditions include the following: none. Obesity is unchanged. BMI is is above average; BMI management plan is completed. We discussed portion control and increasing exercise.        Result Review :                           Assessment and Plan        Diagnoses and all orders for this visit:    1. Annual physical exam (Primary)  -     Mammo Screening Digital Tomosynthesis Bilateral With CAD; Future  -     Ambulatory Referral For Screening Colonoscopy    2. Encounter for screening mammogram for malignant neoplasm of breast  -     Mammo Screening Digital Tomosynthesis Bilateral With CAD; Future    3. Abdominal cramping  Comments:  stable on Bentyl 20mg prn, continue  Orders:  -     NM HIDA Scan With Pharmacological Intervention; Future    4. Other migraine with status migrainosus, not intractable  Comments:  stable oan Maxalt 10mg prn, continue    5. Need for Tdap vaccination  -     Cancel: Tdap Vaccine Greater Than or Equal To 6yo IM    6. Screening for colon cancer  -     Ambulatory Referral For Screening Colonoscopy      Preventative Counseling:  Advised monthly self breast exams  Healthy diet  Daily exercise        Follow Up     Return in about 6 months (around 10/9/2024), or if symptoms worsen or fail to improve.    Patient was given  instructions and counseling regarding her condition or for health maintenance advice. Please see specific information pulled into the AVS if appropriate.     Zaria Santacruz  reports that she quit smoking about 11 years ago. Her smoking use included cigarettes. She started smoking about 28 years ago. She has a 8.5 pack-year smoking history. She has never been exposed to tobacco smoke. She has never used smokeless tobacco.

## 2024-04-26 ENCOUNTER — HOSPITAL ENCOUNTER (OUTPATIENT)
Dept: NUCLEAR MEDICINE | Facility: HOSPITAL | Age: 44
Discharge: HOME | End: 2024-04-26
Admitting: NURSE PRACTITIONER
Payer: OTHER GOVERNMENT

## 2024-04-26 DIAGNOSIS — R10.9 ABDOMINAL CRAMPING: ICD-10-CM

## 2024-04-26 PROCEDURE — 0 TECHNETIUM TC 99M MEBROFENIN KIT: Performed by: NURSE PRACTITIONER

## 2024-04-26 PROCEDURE — A9537 TC99M MEBROFENIN: HCPCS | Performed by: NURSE PRACTITIONER

## 2024-04-26 PROCEDURE — 78227 HEPATOBIL SYST IMAGE W/DRUG: CPT

## 2024-04-26 RX ORDER — KIT FOR THE PREPARATION OF TECHNETIUM TC 99M MEBROFENIN 45 MG/10ML
1 INJECTION, POWDER, LYOPHILIZED, FOR SOLUTION INTRAVENOUS
Status: COMPLETED | OUTPATIENT
Start: 2024-04-26 | End: 2024-04-26

## 2024-04-26 RX ADMIN — MEBROFENIN 1 DOSE: 45 INJECTION, POWDER, LYOPHILIZED, FOR SOLUTION INTRAVENOUS at 06:50

## 2024-05-09 ENCOUNTER — CLINICAL SUPPORT (OUTPATIENT)
Dept: GASTROENTEROLOGY | Facility: CLINIC | Age: 44
End: 2024-05-09
Payer: OTHER GOVERNMENT

## 2024-05-09 ENCOUNTER — PREP FOR SURGERY (OUTPATIENT)
Dept: OTHER | Facility: HOSPITAL | Age: 44
End: 2024-05-09
Payer: OTHER GOVERNMENT

## 2024-05-09 ENCOUNTER — TELEPHONE (OUTPATIENT)
Dept: FAMILY MEDICINE CLINIC | Facility: CLINIC | Age: 44
End: 2024-05-09
Payer: OTHER GOVERNMENT

## 2024-05-09 ENCOUNTER — TELEPHONE (OUTPATIENT)
Dept: GASTROENTEROLOGY | Facility: CLINIC | Age: 44
End: 2024-05-09
Payer: OTHER GOVERNMENT

## 2024-05-09 DIAGNOSIS — Z12.11 SCREENING FOR MALIGNANT NEOPLASM OF COLON: Primary | ICD-10-CM

## 2024-05-09 RX ORDER — POLYETHYLENE GLYCOL 3350, SODIUM SULFATE, SODIUM CHLORIDE, POTASSIUM CHLORIDE, ASCORBIC ACID, SODIUM ASCORBATE 140-9-5.2G
1 KIT ORAL TAKE AS DIRECTED
Qty: 1 EACH | Refills: 0 | Status: SHIPPED | OUTPATIENT
Start: 2024-05-09 | End: 2024-05-10

## 2024-05-10 PROBLEM — Z12.11 SCREENING FOR MALIGNANT NEOPLASM OF COLON: Status: ACTIVE | Noted: 2024-05-09

## 2024-06-04 ENCOUNTER — PATIENT MESSAGE (OUTPATIENT)
Dept: FAMILY MEDICINE CLINIC | Facility: CLINIC | Age: 44
End: 2024-06-04
Payer: OTHER GOVERNMENT

## 2024-06-10 ENCOUNTER — OFFICE VISIT (OUTPATIENT)
Dept: FAMILY MEDICINE CLINIC | Facility: CLINIC | Age: 44
End: 2024-06-10
Payer: OTHER GOVERNMENT

## 2024-06-10 VITALS
DIASTOLIC BLOOD PRESSURE: 67 MMHG | HEART RATE: 72 BPM | OXYGEN SATURATION: 97 % | SYSTOLIC BLOOD PRESSURE: 111 MMHG | BODY MASS INDEX: 37.32 KG/M2 | HEIGHT: 65 IN | WEIGHT: 224 LBS

## 2024-06-10 DIAGNOSIS — G43.801 OTHER MIGRAINE WITH STATUS MIGRAINOSUS, NOT INTRACTABLE: Primary | ICD-10-CM

## 2024-06-10 DIAGNOSIS — E66.09 CLASS 2 OBESITY DUE TO EXCESS CALORIES WITHOUT SERIOUS COMORBIDITY WITH BODY MASS INDEX (BMI) OF 37.0 TO 37.9 IN ADULT: ICD-10-CM

## 2024-06-10 PROCEDURE — 99214 OFFICE O/P EST MOD 30 MIN: CPT | Performed by: NURSE PRACTITIONER

## 2024-06-10 RX ORDER — SEMAGLUTIDE 0.25 MG/.5ML
0.25 INJECTION, SOLUTION SUBCUTANEOUS
Qty: 2 ML | Refills: 1 | Status: SHIPPED | OUTPATIENT
Start: 2024-06-10

## 2024-06-10 NOTE — PROGRESS NOTES
Chief Complaint  Migraine, obesity    Subjective            Zaria Santacruz presents to [facility]  History of Present Illness  Pt is a f/u for migraines. Pt would like to discuss options for weight loss medications. PT has been dieting and exercising with little weight loss. No other issues or concerns at this time.     Pt has mammogram schedule 24    Pt as colon screening scheduled 24 with Dr. Rodgers.         Past Medical History:   Diagnosis Date    Aftercare 2018    FOLLOWING EXCISION OF LEFT ANTEROLATERAL ANKLY CYST 2018    Aftercare 2018    FOLLOWING LEFT ANKLE CYST EXCISION    Mass of ankle 2018    Vein disorder        Allergies   Allergen Reactions    Hydrocodone GI Intolerance        Past Surgical History:   Procedure Laterality Date    BARIATRIC SURGERY  2013    CYST REMOVAL Right     ANKLE    GASTRIC BYPASS      HYSTERECTOMY  2015    PARTIAL     KNEE ACL RECONSTRUCTION Left         Social History     Tobacco Use    Smoking status: Former     Current packs/day: 0.00     Average packs/day: 0.5 packs/day for 17.0 years (8.5 ttl pk-yrs)     Types: Cigarettes     Start date:      Quit date:      Years since quittin.4     Passive exposure: Never    Smokeless tobacco: Never    Tobacco comments:     AGES [age]   Substance Use Topics    Alcohol use: Yes     Alcohol/week: 0.0 standard drinks of alcohol     Comment: OCCASIONALLY       Family History   Problem Relation Age of Onset    Stroke Father     Skin cancer Maternal Grandmother     Heart disease Maternal Grandfather     Diabetes Maternal Grandfather     Heart disease Paternal Grandfather     Colon cancer Neg Hx         Current Outpatient Medications on File Prior to Visit   Medication Sig    rizatriptan (Maxalt) 10 MG tablet Take 1 tablet by mouth 1 (One) Time As Needed for Migraine (May repeat dose if migraine is not resolved in 2 hrs) for up to 8 doses. May  "repeat in 2 hours if needed     No current facility-administered medications on file prior to visit.       Health Maintenance Due   Topic Date Due    MAMMOGRAM  Never done    COLORECTAL CANCER SCREENING  Never done    TDAP/TD VACCINES (2 - Td or Tdap) 01/01/2020       Objective     /67   Pulse 72   Ht 165.1 cm (65\")   Wt 102 kg (224 lb)   SpO2 97%   BMI 37.28 kg/m²       Physical Exam  Constitutional:       General: She is not in acute distress.     Appearance: Normal appearance. She is not ill-appearing.   HENT:      Head: Normocephalic and atraumatic.   Cardiovascular:      Rate and Rhythm: Normal rate and regular rhythm.      Heart sounds: Normal heart sounds. No murmur heard.  Pulmonary:      Effort: Pulmonary effort is normal. No respiratory distress.      Breath sounds: Normal breath sounds.   Chest:      Chest wall: No tenderness.   Abdominal:      General: Abdomen is flat. Bowel sounds are normal. There is no distension.      Palpations: Abdomen is soft. There is no mass.      Tenderness: There is no abdominal tenderness. There is no guarding.   Musculoskeletal:         General: No swelling or tenderness. Normal range of motion.      Cervical back: Normal range of motion and neck supple.   Skin:     General: Skin is warm and dry.      Findings: No rash.   Neurological:      General: No focal deficit present.      Mental Status: She is alert and oriented to person, place, and time. Mental status is at baseline.      Gait: Gait normal.   Psychiatric:         Mood and Affect: Mood normal.         Behavior: Behavior normal.         Thought Content: Thought content normal.         Judgment: Judgment normal.           Result Review :                           Assessment and Plan        Diagnoses and all orders for this visit:    1. Other migraine with status migrainosus, not intractable (Primary)  Comments:  stable on Maxal 10mg, continue    2. Class 2 obesity due to excess calories without serious " comorbidity with body mass index (BMI) of 37.0 to 37.9 in adult  Comments:  encouraged low calorie diet and daily cardio exericise, will start Wegovy as well  Orders:  -     Semaglutide-Weight Management (Wegovy) 0.25 MG/0.5ML solution auto-injector; Inject 0.5 mL under the skin into the appropriate area as directed Every 7 (Seven) Days.  Dispense: 2 mL; Refill: 1              Follow Up     Return in about 1 month (around 7/10/2024).    Patient was given instructions and counseling regarding her condition or for health maintenance advice. Please see specific information pulled into the AVS if appropriate.     Zaria Santacruz  reports that she quit smoking about 11 years ago. Her smoking use included cigarettes. She started smoking about 28 years ago. She has a 8.5 pack-year smoking history. She has never been exposed to tobacco smoke. She has never used smokeless tobacco.

## 2024-06-11 ENCOUNTER — HOSPITAL ENCOUNTER (OUTPATIENT)
Dept: MAMMOGRAPHY | Facility: HOSPITAL | Age: 44
Discharge: HOME | End: 2024-06-11
Admitting: NURSE PRACTITIONER
Payer: OTHER GOVERNMENT

## 2024-06-11 DIAGNOSIS — Z00.00 ANNUAL PHYSICAL EXAM: ICD-10-CM

## 2024-06-11 DIAGNOSIS — Z12.31 ENCOUNTER FOR SCREENING MAMMOGRAM FOR MALIGNANT NEOPLASM OF BREAST: ICD-10-CM

## 2024-06-11 PROCEDURE — 77067 SCR MAMMO BI INCL CAD: CPT

## 2024-06-11 PROCEDURE — 77063 BREAST TOMOSYNTHESIS BI: CPT

## 2024-06-19 ENCOUNTER — TELEPHONE (OUTPATIENT)
Dept: FAMILY MEDICINE CLINIC | Facility: CLINIC | Age: 44
End: 2024-06-19

## 2024-06-19 ENCOUNTER — CLINICAL SUPPORT (OUTPATIENT)
Dept: FAMILY MEDICINE CLINIC | Facility: CLINIC | Age: 44
End: 2024-06-19
Payer: OTHER GOVERNMENT

## 2024-06-19 DIAGNOSIS — Z79.899 MEDICATION MANAGEMENT: Primary | ICD-10-CM

## 2024-06-19 LAB
AMPHET+METHAMPHET UR QL: NEGATIVE
AMPHETAMINE INTERNAL CONTROL: NORMAL
AMPHETAMINES UR QL: NEGATIVE
BARBITURATE INTERNAL CONTROL: NORMAL
BARBITURATES UR QL SCN: NEGATIVE
BENZODIAZ UR QL SCN: NEGATIVE
BENZODIAZEPINE INTERNAL CONTROL: NORMAL
BUPRENORPHINE INTERNAL CONTROL: NORMAL
BUPRENORPHINE SERPL-MCNC: NEGATIVE NG/ML
CANNABINOIDS SERPL QL: NEGATIVE
COCAINE INTERNAL CONTROL: NORMAL
COCAINE UR QL: NEGATIVE
EXPIRATION DATE: NORMAL
Lab: NORMAL
MDMA (ECSTASY) INTERNAL CONTROL: NORMAL
MDMA UR QL SCN: NEGATIVE
METHADONE INTERNAL CONTROL: NORMAL
METHADONE UR QL SCN: NEGATIVE
METHAMPHETAMINE INTERNAL CONTROL: NORMAL
MORPHINE INTERNAL CONTROL: NORMAL
MORPHINE/OPIATES SCREEN, URINE: NEGATIVE
OXYCODONE INTERNAL CONTROL: NORMAL
OXYCODONE UR QL SCN: NEGATIVE
PCP UR QL SCN: NEGATIVE
PHENCYCLIDINE INTERNAL CONTROL: NORMAL
THC INTERNAL CONTROL: NORMAL

## 2024-06-19 PROCEDURE — 80305 DRUG TEST PRSMV DIR OPT OBS: CPT | Performed by: NURSE PRACTITIONER

## 2024-06-19 RX ORDER — PHENTERMINE HYDROCHLORIDE 37.5 MG/1
37.5 CAPSULE ORAL EVERY MORNING
Qty: 30 CAPSULE | Refills: 0 | Status: SHIPPED | OUTPATIENT
Start: 2024-06-19

## 2024-06-19 NOTE — TELEPHONE ENCOUNTER
Pt came in to do UDS to start phentermine    LV 6/10/24  NV 7/25/24   UDS AND CC TODAY  INITIAL FILL

## 2024-07-08 ENCOUNTER — PATIENT MESSAGE (OUTPATIENT)
Dept: FAMILY MEDICINE CLINIC | Facility: CLINIC | Age: 44
End: 2024-07-08
Payer: OTHER GOVERNMENT

## 2024-07-08 DIAGNOSIS — G43.009 MIGRAINE WITHOUT AURA AND WITHOUT STATUS MIGRAINOSUS, NOT INTRACTABLE: ICD-10-CM

## 2024-07-08 RX ORDER — RIZATRIPTAN BENZOATE 10 MG/1
10 TABLET ORAL ONCE AS NEEDED
Qty: 8 TABLET | Refills: 1 | Status: SHIPPED | OUTPATIENT
Start: 2024-07-08

## 2024-07-08 NOTE — TELEPHONE ENCOUNTER
From: Zaria Santacruz  To: Joanna Faulkner  Sent: 7/8/2024 10:55 AM EDT  Subject: Med refill    Can you send a script for rizatriptan 10 MG tablet. I have started having more migraines seems to be triggered by the weather. Thank you!

## 2024-07-24 ENCOUNTER — TELEPHONE (OUTPATIENT)
Dept: GASTROENTEROLOGY | Facility: CLINIC | Age: 44
End: 2024-07-24
Payer: OTHER GOVERNMENT

## 2024-07-25 ENCOUNTER — OFFICE VISIT (OUTPATIENT)
Dept: FAMILY MEDICINE CLINIC | Facility: CLINIC | Age: 44
End: 2024-07-25
Payer: OTHER GOVERNMENT

## 2024-07-25 VITALS
RESPIRATION RATE: 16 BRPM | WEIGHT: 207.2 LBS | SYSTOLIC BLOOD PRESSURE: 112 MMHG | TEMPERATURE: 98.1 F | BODY MASS INDEX: 34.52 KG/M2 | HEART RATE: 82 BPM | DIASTOLIC BLOOD PRESSURE: 71 MMHG | OXYGEN SATURATION: 99 % | HEIGHT: 65 IN

## 2024-07-25 DIAGNOSIS — E66.09 CLASS 1 OBESITY DUE TO EXCESS CALORIES WITHOUT SERIOUS COMORBIDITY WITH BODY MASS INDEX (BMI) OF 34.0 TO 34.9 IN ADULT: ICD-10-CM

## 2024-07-25 DIAGNOSIS — Z23 NEED FOR TDAP VACCINATION: ICD-10-CM

## 2024-07-25 DIAGNOSIS — G43.009 MIGRAINE WITHOUT AURA AND WITHOUT STATUS MIGRAINOSUS, NOT INTRACTABLE: Primary | ICD-10-CM

## 2024-07-25 RX ORDER — PHENTERMINE HYDROCHLORIDE 37.5 MG/1
37.5 CAPSULE ORAL EVERY MORNING
Qty: 30 CAPSULE | Refills: 0 | Status: SHIPPED | OUTPATIENT
Start: 2024-07-25

## 2024-07-25 NOTE — PROGRESS NOTES
Chief Complaint  Migraine, Obesity    Subjective            Zaria Santacruz presents to [facility]  History of Present Illness  Pt here for f/u migraine and obesity.  She started Phentermine 1 month ago and start wt was 224lbs.   She is 207lbs today, she has lost 17lbs this month.    PT is due Tdap and will get today in office.    PT is due a colonoscopy she has had her consult  and needs to schedule colonoscopy with Dr. Howell pt to call and self schedule as she had to cancel her last appt.  PT reports she has the phone number and will call to schedule.        Past Medical History:   Diagnosis Date    Aftercare 2018    FOLLOWING EXCISION OF LEFT ANTEROLATERAL ANKLY CYST 2018    Aftercare 2018    FOLLOWING LEFT ANKLE CYST EXCISION    Mass of ankle 2018    Vein disorder        Allergies   Allergen Reactions    Hydrocodone GI Intolerance        Past Surgical History:   Procedure Laterality Date    BARIATRIC SURGERY  2013    CYST REMOVAL Right     ANKLE    GASTRIC BYPASS      HYSTERECTOMY  2015    PARTIAL     KNEE ACL RECONSTRUCTION Left         Social History     Tobacco Use    Smoking status: Former     Current packs/day: 0.00     Average packs/day: 0.5 packs/day for 17.0 years (8.5 ttl pk-yrs)     Types: Cigarettes     Start date:      Quit date:      Years since quittin.5     Passive exposure: Never    Smokeless tobacco: Never    Tobacco comments:     AGES [age]   Substance Use Topics    Alcohol use: Yes     Alcohol/week: 0.0 standard drinks of alcohol     Comment: OCCASIONALLY       Family History   Problem Relation Age of Onset    Stroke Father     Skin cancer Maternal Grandmother     Heart disease Maternal Grandfather     Diabetes Maternal Grandfather     Heart disease Paternal Grandfather     Colon cancer Neg Hx         Current Outpatient Medications on File Prior to Visit   Medication Sig    rizatriptan (Maxalt) 10 MG  "tablet Take 1 tablet by mouth 1 (One) Time As Needed for Migraine (May repeat dose if migraine is not resolved in 2 hrs). May repeat in 2 hours if needed    [DISCONTINUED] phentermine 37.5 MG capsule Take 1 capsule by mouth Every Morning.    [DISCONTINUED] Semaglutide-Weight Management (Wegovy) 0.25 MG/0.5ML solution auto-injector Inject 0.5 mL under the skin into the appropriate area as directed Every 7 (Seven) Days. (Patient not taking: Reported on 7/25/2024)     No current facility-administered medications on file prior to visit.       Health Maintenance Due   Topic Date Due    COLORECTAL CANCER SCREENING  Never done    TDAP/TD VACCINES (2 - Td or Tdap) 01/01/2020    COVID-19 Vaccine (3 - 2023-24 season) 09/01/2023       Objective     /71 (BP Location: Right arm, Patient Position: Sitting, Cuff Size: Adult)   Pulse 82   Temp 98.1 °F (36.7 °C) (Oral)   Resp 16   Ht 165.1 cm (65\")   Wt 94 kg (207 lb 3.2 oz)   SpO2 99%   BMI 34.48 kg/m²       Physical Exam  Constitutional:       General: She is not in acute distress.     Appearance: Normal appearance. She is not ill-appearing.   HENT:      Head: Normocephalic and atraumatic.   Cardiovascular:      Rate and Rhythm: Normal rate and regular rhythm.      Heart sounds: Normal heart sounds. No murmur heard.  Pulmonary:      Effort: Pulmonary effort is normal. No respiratory distress.      Breath sounds: Normal breath sounds.   Chest:      Chest wall: No tenderness.   Abdominal:      General: Abdomen is flat. Bowel sounds are normal. There is no distension.      Palpations: Abdomen is soft. There is no mass.      Tenderness: There is no abdominal tenderness. There is no guarding.   Musculoskeletal:         General: No swelling or tenderness. Normal range of motion.      Cervical back: Normal range of motion and neck supple.   Skin:     General: Skin is warm and dry.      Findings: No rash.   Neurological:      General: No focal deficit present.      Mental " Status: She is alert and oriented to person, place, and time. Mental status is at baseline.      Gait: Gait normal.   Psychiatric:         Mood and Affect: Mood normal.         Behavior: Behavior normal.         Thought Content: Thought content normal.         Judgment: Judgment normal.           Result Review :                           Assessment and Plan        Diagnoses and all orders for this visit:    1. Migraine without aura and without status migrainosus, not intractable (Primary)  Comments:  stable on Maxalt 10mg, continue    2. Need for Tdap vaccination  -     Tdap Vaccine => 6yo IM (BOOSTRIX/ADACEL)    3. Class 1 obesity due to excess calories without serious comorbidity with body mass index (BMI) of 34.0 to 34.9 in adult  Comments:  continue phentermine 37.5mg, encourage low calorie diet and daily carido as well  Orders:  -     phentermine 37.5 MG capsule; Take 1 capsule by mouth Every Morning.  Dispense: 30 capsule; Refill: 0              Follow Up     Return in about 1 month (around 8/25/2024).    Patient was given instructions and counseling regarding her condition or for health maintenance advice. Please see specific information pulled into the AVS if appropriate.     Zaria Santacruz  reports that she quit smoking about 11 years ago. Her smoking use included cigarettes. She started smoking about 28 years ago. She has a 8.5 pack-year smoking history. She has never been exposed to tobacco smoke. She has never used smokeless tobacco.        NABIL Remy

## 2024-08-07 ENCOUNTER — PATIENT MESSAGE (OUTPATIENT)
Dept: FAMILY MEDICINE CLINIC | Facility: CLINIC | Age: 44
End: 2024-08-07
Payer: OTHER GOVERNMENT

## 2024-08-08 ENCOUNTER — OFFICE VISIT (OUTPATIENT)
Dept: FAMILY MEDICINE CLINIC | Facility: CLINIC | Age: 44
End: 2024-08-08
Payer: OTHER GOVERNMENT

## 2024-08-08 VITALS
HEART RATE: 70 BPM | HEIGHT: 65 IN | SYSTOLIC BLOOD PRESSURE: 98 MMHG | OXYGEN SATURATION: 100 % | BODY MASS INDEX: 33.99 KG/M2 | DIASTOLIC BLOOD PRESSURE: 64 MMHG | WEIGHT: 204 LBS

## 2024-08-08 DIAGNOSIS — N63.10 MASS OF RIGHT BREAST, UNSPECIFIED QUADRANT: Primary | ICD-10-CM

## 2024-08-08 PROCEDURE — 99214 OFFICE O/P EST MOD 30 MIN: CPT | Performed by: NURSE PRACTITIONER

## 2024-08-08 NOTE — PROGRESS NOTES
"Chief Complaint  Breast Mass (Right/)    Subjective            Zaria Santacruz presents to [facility]  History of Present Illness  Pt has c/o a lump in (R) breast. Pt states she found this on 24. Pt reports this in the middle of the breast/nipple area. Pt reports no pain, but a \"throbbing\" sensation. Pt denies any nipple discharge. She does have patrick nipple piercings in place but states they have been there since 2023 with no issues.    Normal screening mammogram done 2024        Past Medical History:   Diagnosis Date    Aftercare 2018    FOLLOWING EXCISION OF LEFT ANTEROLATERAL ANKLY CYST 2018    Aftercare 2018    FOLLOWING LEFT ANKLE CYST EXCISION    Mass of ankle 2018    Vein disorder        Allergies   Allergen Reactions    Hydrocodone GI Intolerance        Past Surgical History:   Procedure Laterality Date    BARIATRIC SURGERY  2013    CYST REMOVAL Right 1998    ANKLE    GASTRIC BYPASS  2013    HYSTERECTOMY  2015    PARTIAL     KNEE ACL RECONSTRUCTION Left         Social History     Tobacco Use    Smoking status: Former     Current packs/day: 0.00     Average packs/day: 0.5 packs/day for 17.0 years (8.5 ttl pk-yrs)     Types: Cigarettes     Start date:      Quit date:      Years since quittin.6     Passive exposure: Never    Smokeless tobacco: Never    Tobacco comments:     AGES [age]   Substance Use Topics    Alcohol use: Yes     Alcohol/week: 0.0 standard drinks of alcohol     Comment: OCCASIONALLY       Family History   Problem Relation Age of Onset    Stroke Father     Skin cancer Maternal Grandmother     Heart disease Maternal Grandfather     Diabetes Maternal Grandfather     Heart disease Paternal Grandfather     Colon cancer Neg Hx         Current Outpatient Medications on File Prior to Visit   Medication Sig    phentermine 37.5 MG capsule Take 1 capsule by mouth Every Morning.    rizatriptan (Maxalt) 10 MG " "tablet Take 1 tablet by mouth 1 (One) Time As Needed for Migraine (May repeat dose if migraine is not resolved in 2 hrs). May repeat in 2 hours if needed     No current facility-administered medications on file prior to visit.       Health Maintenance Due   Topic Date Due    COLORECTAL CANCER SCREENING  Never done    COVID-19 Vaccine (3 - 2023-24 season) 09/01/2023    INFLUENZA VACCINE  08/01/2024       Objective     BP 98/64   Pulse 70   Ht 165.1 cm (65\")   Wt 92.5 kg (204 lb)   SpO2 100%   BMI 33.95 kg/m²       Physical Exam  Constitutional:       General: She is not in acute distress.     Appearance: Normal appearance. She is not ill-appearing.   HENT:      Head: Normocephalic and atraumatic.   Cardiovascular:      Rate and Rhythm: Normal rate and regular rhythm.      Heart sounds: Normal heart sounds. No murmur heard.  Pulmonary:      Effort: Pulmonary effort is normal. No respiratory distress.      Breath sounds: Normal breath sounds.   Chest:      Chest wall: No tenderness.      Comments: Palpable golf ball size mass below nipple on right breast, no dimpling, no redness, no  heat, no pain, no nipple discharge  Musculoskeletal:         General: No swelling or tenderness. Normal range of motion.      Cervical back: Normal range of motion and neck supple.   Skin:     General: Skin is warm and dry.      Findings: No rash.   Neurological:      General: No focal deficit present.      Mental Status: She is alert and oriented to person, place, and time. Mental status is at baseline.      Gait: Gait normal.   Psychiatric:         Mood and Affect: Mood normal.         Behavior: Behavior normal.         Thought Content: Thought content normal.         Judgment: Judgment normal.           Result Review :                           Assessment and Plan        Diagnoses and all orders for this visit:    1. Mass of right breast, unspecified quadrant (Primary)  -     Mammo Diagnostic Digital Tomosynthesis Right With CAD; " Future  -     US Breast Right Limited; Future              Follow Up     Return if symptoms worsen or fail to improve.    Patient was given instructions and counseling regarding her condition or for health maintenance advice. Please see specific information pulled into the AVS if appropriate.     Zaria Santacruz  reports that she quit smoking about 11 years ago. Her smoking use included cigarettes. She started smoking about 28 years ago. She has a 8.5 pack-year smoking history. She has never been exposed to tobacco smoke. She has never used smokeless tobacco.

## 2024-08-17 ENCOUNTER — HOSPITAL ENCOUNTER (EMERGENCY)
Facility: HOSPITAL | Age: 44
Discharge: HOME | End: 2024-08-17
Attending: EMERGENCY MEDICINE
Payer: OTHER GOVERNMENT

## 2024-08-17 ENCOUNTER — APPOINTMENT (OUTPATIENT)
Dept: CT IMAGING | Facility: HOSPITAL | Age: 44
End: 2024-08-17
Payer: OTHER GOVERNMENT

## 2024-08-17 VITALS
HEIGHT: 65 IN | TEMPERATURE: 98.7 F | WEIGHT: 203.26 LBS | RESPIRATION RATE: 18 BRPM | OXYGEN SATURATION: 99 % | BODY MASS INDEX: 33.87 KG/M2 | HEART RATE: 78 BPM | SYSTOLIC BLOOD PRESSURE: 119 MMHG | DIASTOLIC BLOOD PRESSURE: 80 MMHG

## 2024-08-17 DIAGNOSIS — N61.0 CELLULITIS OF FEMALE BREAST: Primary | ICD-10-CM

## 2024-08-17 LAB
ALBUMIN SERPL-MCNC: 3.8 G/DL (ref 3.5–5.2)
ALBUMIN/GLOB SERPL: 1.1 G/DL
ALP SERPL-CCNC: 61 U/L (ref 39–117)
ALT SERPL W P-5'-P-CCNC: 9 U/L (ref 1–33)
ANION GAP SERPL CALCULATED.3IONS-SCNC: 8.6 MMOL/L (ref 5–15)
AST SERPL-CCNC: 16 U/L (ref 1–32)
BASOPHILS # BLD AUTO: 0.05 10*3/MM3 (ref 0–0.2)
BASOPHILS NFR BLD AUTO: 0.7 % (ref 0–1.5)
BILIRUB SERPL-MCNC: 0.5 MG/DL (ref 0–1.2)
BUN SERPL-MCNC: 11 MG/DL (ref 6–20)
BUN/CREAT SERPL: 13.4 (ref 7–25)
CALCIUM SPEC-SCNC: 9 MG/DL (ref 8.6–10.5)
CHLORIDE SERPL-SCNC: 102 MMOL/L (ref 98–107)
CO2 SERPL-SCNC: 26.4 MMOL/L (ref 22–29)
CREAT SERPL-MCNC: 0.82 MG/DL (ref 0.57–1)
DEPRECATED RDW RBC AUTO: 41.6 FL (ref 37–54)
EGFRCR SERPLBLD CKD-EPI 2021: 89.5 ML/MIN/1.73
EOSINOPHIL # BLD AUTO: 0.15 10*3/MM3 (ref 0–0.4)
EOSINOPHIL NFR BLD AUTO: 2 % (ref 0.3–6.2)
ERYTHROCYTE [DISTWIDTH] IN BLOOD BY AUTOMATED COUNT: 12.6 % (ref 12.3–15.4)
GLOBULIN UR ELPH-MCNC: 3.4 GM/DL
GLUCOSE SERPL-MCNC: 86 MG/DL (ref 65–99)
HCT VFR BLD AUTO: 36.7 % (ref 34–46.6)
HGB BLD-MCNC: 12 G/DL (ref 12–15.9)
HOLD SPECIMEN: NORMAL
HOLD SPECIMEN: NORMAL
IMM GRANULOCYTES # BLD AUTO: 0.01 10*3/MM3 (ref 0–0.05)
IMM GRANULOCYTES NFR BLD AUTO: 0.1 % (ref 0–0.5)
LYMPHOCYTES # BLD AUTO: 1.67 10*3/MM3 (ref 0.7–3.1)
LYMPHOCYTES NFR BLD AUTO: 22.5 % (ref 19.6–45.3)
MCH RBC QN AUTO: 29.6 PG (ref 26.6–33)
MCHC RBC AUTO-ENTMCNC: 32.7 G/DL (ref 31.5–35.7)
MCV RBC AUTO: 90.4 FL (ref 79–97)
MONOCYTES # BLD AUTO: 0.74 10*3/MM3 (ref 0.1–0.9)
MONOCYTES NFR BLD AUTO: 10 % (ref 5–12)
NEUTROPHILS NFR BLD AUTO: 4.79 10*3/MM3 (ref 1.7–7)
NEUTROPHILS NFR BLD AUTO: 64.7 % (ref 42.7–76)
NRBC BLD AUTO-RTO: 0 /100 WBC (ref 0–0.2)
PLATELET # BLD AUTO: 285 10*3/MM3 (ref 140–450)
PMV BLD AUTO: 10.1 FL (ref 6–12)
POTASSIUM SERPL-SCNC: 3.7 MMOL/L (ref 3.5–5.2)
PROT SERPL-MCNC: 7.2 G/DL (ref 6–8.5)
RBC # BLD AUTO: 4.06 10*6/MM3 (ref 3.77–5.28)
SODIUM SERPL-SCNC: 137 MMOL/L (ref 136–145)
WBC NRBC COR # BLD AUTO: 7.41 10*3/MM3 (ref 3.4–10.8)
WHOLE BLOOD HOLD COAG: NORMAL
WHOLE BLOOD HOLD SPECIMEN: NORMAL

## 2024-08-17 PROCEDURE — 80053 COMPREHEN METABOLIC PANEL: CPT

## 2024-08-17 PROCEDURE — 71250 CT THORAX DX C-: CPT

## 2024-08-17 PROCEDURE — 85025 COMPLETE CBC W/AUTO DIFF WBC: CPT

## 2024-08-17 PROCEDURE — 99284 EMERGENCY DEPT VISIT MOD MDM: CPT

## 2024-08-17 PROCEDURE — 36415 COLL VENOUS BLD VENIPUNCTURE: CPT

## 2024-08-17 RX ORDER — CEPHALEXIN 500 MG/1
500 CAPSULE ORAL 4 TIMES DAILY
Qty: 28 CAPSULE | Refills: 0 | Status: SHIPPED | OUTPATIENT
Start: 2024-08-17 | End: 2024-08-24

## 2024-08-17 NOTE — ED PROVIDER NOTES
Time: 7:36 PM EDT  Date of encounter:  8/17/2024  Independent Historian/Clinical History and Information was obtained by:   Patient    History is limited by: N/A    Chief Complaint   Patient presents with    Breast Problem         History of Present Illness:  Patient is a 44 y.o. year old female who presents to the emergency department for evaluation of breast mass.  Patient states that she noticed a breast mass on August 8 and was seen the same day by her primary care provider who ordered a mammogram and ultrasound.  Patient is scheduled to have this on Tuesday.  Patient states that today she woke up and the breast was warm, tender, and aching.  Patient denies fevers or chills, denies drainage from the breast.    Patient Care Team  Primary Care Provider: Joanna Faulkner APRN    Past Medical History:     Allergies   Allergen Reactions    Hydrocodone GI Intolerance     Past Medical History:   Diagnosis Date    Aftercare 04/23/2018    FOLLOWING EXCISION OF LEFT ANTEROLATERAL ANKLY CYST 4/11/2018    Aftercare 04/27/2018    FOLLOWING LEFT ANKLE CYST EXCISION    Mass of ankle 03/29/2018    Vein disorder      Past Surgical History:   Procedure Laterality Date    BARIATRIC SURGERY  November 2013    CYST REMOVAL Right 1998    ANKLE    GASTRIC BYPASS  2013    HYSTERECTOMY  2015    PARTIAL     KNEE ACL RECONSTRUCTION Left 1999     Family History   Problem Relation Age of Onset    Stroke Father     Skin cancer Maternal Grandmother     Heart disease Maternal Grandfather     Diabetes Maternal Grandfather     Heart disease Paternal Grandfather     Colon cancer Neg Hx        Home Medications:  Prior to Admission medications    Medication Sig Start Date End Date Taking? Authorizing Provider   phentermine 37.5 MG capsule Take 1 capsule by mouth Every Morning. 7/25/24   Joanna Faulkner APRN   rizatriptan (Maxalt) 10 MG tablet Take 1 tablet by mouth 1 (One) Time As Needed for Migraine (May repeat dose if migraine is not resolved in 2  "hrs). May repeat in 2 hours if needed 24   Joanna Faulkner APRN        Social History:   Social History     Tobacco Use    Smoking status: Former     Current packs/day: 0.00     Average packs/day: 0.5 packs/day for 17.0 years (8.5 ttl pk-yrs)     Types: Cigarettes     Start date:      Quit date:      Years since quittin.6     Passive exposure: Never    Smokeless tobacco: Never    Tobacco comments:     AGES [age]   Vaping Use    Vaping status: Never Used   Substance Use Topics    Alcohol use: Yes     Alcohol/week: 0.0 standard drinks of alcohol     Comment: OCCASIONALLY    Drug use: Never         Review of Systems:  Review of Systems   Constitutional:  Positive for fatigue. Negative for fever.   HENT:  Negative for sore throat.    Eyes: Negative.    Respiratory:  Negative for cough and shortness of breath.    Cardiovascular:  Negative for chest pain.   Gastrointestinal:  Negative for abdominal pain, diarrhea and vomiting.   Genitourinary:  Negative for dysuria.        Breast pain   Musculoskeletal:  Negative for neck pain.   Skin:  Positive for color change. Negative for rash and wound.   Allergic/Immunologic: Negative.    Neurological:  Negative for weakness, numbness and headaches.   Hematological: Negative.    Psychiatric/Behavioral: Negative.     All other systems reviewed and are negative.       Physical Exam:  /80   Pulse 78   Temp 98.7 °F (37.1 °C) (Oral)   Resp 18   Ht 165.1 cm (65\")   Wt 92.2 kg (203 lb 4.2 oz)   SpO2 99%   BMI 33.82 kg/m²         Physical Exam  Vitals and nursing note reviewed. Exam conducted with a chaperone present.   Constitutional:       General: She is not in acute distress.     Appearance: Normal appearance. She is not toxic-appearing.   HENT:      Head: Normocephalic and atraumatic.      Jaw: There is normal jaw occlusion.   Eyes:      General: Lids are normal.      Extraocular Movements: Extraocular movements intact.      Conjunctiva/sclera: " Conjunctivae normal.      Pupils: Pupils are equal, round, and reactive to light.   Cardiovascular:      Rate and Rhythm: Normal rate and regular rhythm.      Pulses: Normal pulses.      Heart sounds: Normal heart sounds.   Pulmonary:      Effort: Pulmonary effort is normal. No respiratory distress.      Breath sounds: Normal breath sounds. No wheezing or rhonchi.   Chest:      Chest wall: Mass and edema present.   Breasts:     Right: Swelling, skin change and tenderness present. No bleeding or nipple discharge.       Abdominal:      General: Abdomen is flat.      Palpations: Abdomen is soft.      Tenderness: There is no abdominal tenderness. There is no guarding or rebound.   Musculoskeletal:         General: Normal range of motion.      Cervical back: Normal range of motion and neck supple.      Right lower leg: No edema.      Left lower leg: No edema.   Skin:     General: Skin is warm and dry.   Neurological:      Mental Status: She is alert and oriented to person, place, and time. Mental status is at baseline.   Psychiatric:         Mood and Affect: Mood normal.            Procedures:  Procedures      Medical Decision Making:      Comorbidities that affect care:    None    External Notes reviewed:    Previous Clinic Note: Family medicine office visit from 8/8/2024 where patient was seen for the mass in her right breast, orders were placed for mammogram and breast ultrasound, which are scheduled for Tuesday, which is 3 days from now      The following orders were placed and all results were independently analyzed by me:  Orders Placed This Encounter   Procedures    CT Chest Without Contrast Diagnostic    Comprehensive Metabolic Panel    Medon Draw    CBC Auto Differential    CBC & Differential    Green Top (Gel)    Lavender Top    Gold Top - SST    Light Blue Top       Medications Given in the Emergency Department:  Medications - No data to display     ED Course:    The patient was initially evaluated in the  triage area where orders were placed. The patient was later dispositioned by NABLI Martinez.      The patient was advised to stay for completion of workup which includes but is not limited to communication of labs and radiological results, reassessment and plan. The patient was advised that leaving prior to disposition by a provider could result in critical findings that are not communicated to the patient.     ED Course as of 08/17/24 2220   Sat Aug 17, 2024   1939 PROVIDER IN TRIAGE  Patient was evaluated by me in triage, Bailey Seaver, APRN, FNP-C.  Orders were placed and patient is currently awaiting final results and disposition. [AS]      ED Course User Index  [AS] Seaver, Alyce B, APRN       Labs:    Lab Results (last 24 hours)       Procedure Component Value Units Date/Time    CBC & Differential [[id number]]  (Normal) Collected: 08/17/24 1944    Specimen: Blood from Arm, Right Updated: 08/17/24 1959    Narrative:      The following orders were created for panel order CBC & Differential.  Procedure                               Abnormality         Status                     ---------                               -----------         ------                     CBC Auto Differential[[id number]]        Normal              Final result                 Please view results for these tests on the individual orders.    Comprehensive Metabolic Panel [[id number]] Collected: 08/17/24 1944    Specimen: Blood from Arm, Right Updated: 08/17/24 2023     Glucose 86 mg/dL      BUN 11 mg/dL      Creatinine 0.82 mg/dL      Sodium 137 mmol/L      Potassium 3.7 mmol/L      Chloride 102 mmol/L      CO2 26.4 mmol/L      Calcium 9.0 mg/dL      Total Protein 7.2 g/dL      Albumin 3.8 g/dL      ALT (SGPT) 9 U/L      AST (SGOT) 16 U/L      Alkaline Phosphatase 61 U/L      Total Bilirubin 0.5 mg/dL      Globulin 3.4 gm/dL      A/G Ratio 1.1 g/dL      BUN/Creatinine Ratio 13.4     Anion Gap 8.6 mmol/L      eGFR 89.5 mL/min/1.73      Narrative:      GFR Normal >60  Chronic Kidney Disease <60  Kidney Failure <15      CBC Auto Differential [[id number]]  (Normal) Collected: 08/17/24 1944    Specimen: Blood from Arm, Right Updated: 08/17/24 1958     WBC 7.41 10*3/mm3      RBC 4.06 10*6/mm3      Hemoglobin 12.0 g/dL      Hematocrit 36.7 %      MCV 90.4 fL      MCH 29.6 pg      MCHC 32.7 g/dL      RDW 12.6 %      RDW-SD 41.6 fl      MPV 10.1 fL      Platelets 285 10*3/mm3      Neutrophil % 64.7 %      Lymphocyte % 22.5 %      Monocyte % 10.0 %      Eosinophil % 2.0 %      Basophil % 0.7 %      Immature Grans % 0.1 %      Neutrophils, Absolute 4.79 10*3/mm3      Lymphocytes, Absolute 1.67 10*3/mm3      Monocytes, Absolute 0.74 10*3/mm3      Eosinophils, Absolute 0.15 10*3/mm3      Basophils, Absolute 0.05 10*3/mm3      Immature Grans, Absolute 0.01 10*3/mm3      nRBC 0.0 /100 WBC              Imaging:    CT Chest Without Contrast Diagnostic    Result Date: 8/17/2024  CT CHEST WO CONTRAST DIAGNOSTIC Date of Exam: 8/17/2024 9:11 PM EDT Indication: breast mass. Cellulitis. Fever. Comparison: Mammogram 6/11/2024 Technique: Axial CT images were obtained of the chest without contrast administration.  Reconstructed coronal and sagittal images were also obtained. Automated exposure control and iterative construction methods were used. Findings: There is skin thickening and subcutaneous fat stranding in the right breast. Subareolar soft tissue change in the right breast measures approximately 3.5 x 2.8 cm. The prior mammogram was negative in this area. Based on history, this could reflect an abscess or phlegmon. This would be better assessed with a breast ultrasound. No pleural or pericardial effusion is seen. There is no adenopathy allowing for the lack of contrast. Small hiatal hernia is present. Patient is status post gastric bypass surgery. The lungs are clear. No acute infiltrates. No pneumothorax. No acute osseous lesions.     1. Skin thickening and fat  stranding in the right breast compatible with history of cellulitis. Soft tissue changes may reflect a phlegmon or abscess. Findings could be further characterized with breast ultrasound. Follow-up mammogram will likely be needed as well. Inflammatory breast cancer could theoretically have this appearance but is considered unlikely based on a relatively recent normal mammogram. 2. Otherwise negative CT of the chest. Electronically Signed: Fer Conklin MD  8/17/2024 9:31 PM EDT  Workstation ID: [id number]       Differential Diagnosis and Discussion:      Abscess: Differential diagnosis for an abscess includes but is not limited to bacterial or fungal infections, foreign body reactions, malignancies, and autoimmune or inflammatory conditions.    All labs were reviewed and interpreted by me.  CT scan radiology impression was interpreted by me.    MDM     Amount and/or Complexity of Data Reviewed  Clinical lab tests: reviewed  Tests in the radiology section of CPT®: reviewed    The patient has erythema and pain consistent with cellulitis. The area of erythema was demarcated in the ED. The patient has no fluctuance or induration consistent with abscess formation. The patient was started on antibiotics in the Emergency Department. The patient is resting comfortably and feels better, is alert and in no distress. On re-examination the patient does not appear toxic and has no meningeal signs, and there's no intractable vomiting or respiratory distress. Based on the history, exam, diagnostic testing and reassessment, the patient has no signs of sepsis.  The patient's vital signs have been stable. The patient's condition is stable and is appropriate for discharge. The patient will pursue further outpatient evaluation with the primary care physician or other designated or consultant physician as indicated in the discharge instructions. The patient was counseled that a repeat examination within two days is imperative. This can  be done as an outpatient. Patient advised to return to the ED if outpatient evaluation is not feasible. The patient was counseled to return to the ER sooner for worsening of erythema or spread outside of the demarcated lines. The patient was also counseled to return for worsening fever, chills, altered mental status, intractable vomiting or any other symptoms of concern.  Recommended patient follow-up with her PCP, and to have the mammogram and breast ultrasound as scheduled on Tuesday.  Patient verbalizes understanding and agrees with plan of care.            Patient Care Considerations:    SEPSIS was considered but is NOT present in the emergency department as SIRS criteria is not present.      Consultants/Shared Management Plan:    None    Social Determinants of Health:    Patient is independent, reliable, and has access to care.       Disposition and Care Coordination:    Discharged: The patient is suitable and stable for discharge with no need for consideration of admission.    I have explained the patient´s condition, diagnoses and treatment plan based on the information available to me at this time. I have answered questions and addressed any concerns. The patient has a good  understanding of the patient´s diagnosis, condition, and treatment plan as can be expected at this point. The vital signs have been stable. The patient´s condition is stable and appropriate for discharge from the emergency department.      The patient will pursue further outpatient evaluation with the primary care physician or other designated or consulting physician as outlined in the discharge instructions. They are agreeable to this plan of care and follow-up instructions have been explained in detail. The patient has received these instructions in written format and has expressed an understanding of the discharge instructions. The patient is aware that any significant change in condition or worsening of symptoms should prompt an  immediate return to this or the closest emergency department or call to 911.  I have explained discharge medications and the need for follow up with the patient/caretakers. This was also printed in the discharge instructions. Patient was discharged with the following medications and follow up:      Medication List        New Prescriptions      cephalexin 500 MG capsule  Commonly known as: KEFLEX  Take 1 capsule by mouth 4 (Four) Times a Day for 7 days.               Where to Get Your Medications        These medications were sent to [facility]/pharmacy #[id number] - [place], [place] - [address] - [phone] [facility] [phone]   [address] [place] [address]      Hours: 24-hours Phone: [phone]   cephalexin 500 MG capsule      Joanna Faulkner APRN  [address]  [address]  [place] [address]  [phone]    Call in 2 days         Final diagnoses:   Cellulitis of female breast        ED Disposition       ED Disposition   Discharge    Condition   Stable    Comment   --               This medical record created using voice recognition software.             Mary Jo Weems APRN  08/17/24 [id number]

## 2024-08-17 NOTE — ED TRIAGE NOTES
Arrived to ED from home complaining of possible abscess on right breast.  Saw Dr for mass on 8th.  Scheduled for US and mammogram on Tuesday but woke up today and right breast swollen and warm.  Breast red and warm to touch.  Denies fever or discharge.  Pain 5/10 achy

## 2024-08-20 ENCOUNTER — HOSPITAL ENCOUNTER (OUTPATIENT)
Dept: MAMMOGRAPHY | Facility: HOSPITAL | Age: 44
Discharge: HOME | End: 2024-08-20
Payer: OTHER GOVERNMENT

## 2024-08-20 ENCOUNTER — HOSPITAL ENCOUNTER (OUTPATIENT)
Dept: ULTRASOUND IMAGING | Facility: HOSPITAL | Age: 44
Discharge: HOME | End: 2024-08-20
Payer: OTHER GOVERNMENT

## 2024-08-20 DIAGNOSIS — N63.10 MASS OF RIGHT BREAST, UNSPECIFIED QUADRANT: ICD-10-CM

## 2024-08-20 DIAGNOSIS — N61.1 ABSCESS OF BREAST: Primary | ICD-10-CM

## 2024-08-20 PROCEDURE — 76642 ULTRASOUND BREAST LIMITED: CPT

## 2024-08-20 PROCEDURE — G0279 TOMOSYNTHESIS, MAMMO: HCPCS

## 2024-08-20 PROCEDURE — 77065 DX MAMMO INCL CAD UNI: CPT

## 2024-08-21 ENCOUNTER — OFFICE VISIT (OUTPATIENT)
Dept: SURGERY | Facility: CLINIC | Age: 44
End: 2024-08-21
Payer: OTHER GOVERNMENT

## 2024-08-21 VITALS — WEIGHT: 199 LBS | HEIGHT: 65 IN | RESPIRATION RATE: 17 BRPM | BODY MASS INDEX: 33.15 KG/M2

## 2024-08-21 DIAGNOSIS — N61.1 BREAST ABSCESS: Primary | ICD-10-CM

## 2024-08-21 PROCEDURE — 99203 OFFICE O/P NEW LOW 30 MIN: CPT | Performed by: SURGERY

## 2024-08-21 NOTE — PROGRESS NOTES
Chief Complaint: Abscess    Subjective         Abscess    Zaria Santacruz is a 44 y.o. female presents to [facility] to be seen for breast abscess.  Her imaging is shown below:      Study Result    Narrative & Impression   MAMMO DIAGNOSTIC DIGITAL TOMOSYNTHESIS RIGHT W CAD-, US BREAST RIGHT  LIMITED-     Date of Exam: 8/20/2024 12:58 PM     Indication: Diagnostic mammogram and ultrasound  right breast mass;  N63.10-Unspecified lump in the right breast, unspecified quadrant     Comparison: Screening mammogram 6/11/2024. CT scan of the chest  8/17/2024.     Technique: Diagnostic mammographic images of the breast(s) were obtained  in the following views: Routine views of the right breast. Right CC and  right MLO focal compression views. Right ML view. Tomosynthesis was  utilized.  Diagnostic breast ultrasound with grayscale and color imaging  of the breast(s) was performed.     FINDINGS:  There is a new 2.8 cm retroareolar right breast mass compared with the  recent screening mammogram. There are no suspicious microcalcifications.     Focused ultrasound examination of the retroareolar right breast in the  area of the palpable abnormality described by the patient demonstrates a  2.8 cm heterogeneous mass consistent with an abscess. The patient states  that the skin erythema has resolved with antibiotics.        The mammographic images were interpreted with the assistance of a  computer aided detection system.     The patient's information is entered into a computerized reminder system  with a targeted due date for the next mammogram.     IMPRESSION:  OVERALL IMPRESSION:  2.8 cm retroareolar right breast mass consistent with an abscess.  Recommend Surgical consultation.     BI-RADS ASSESSMENT: BI-RADS 2. Benign findings.     There are scattered areas of fibroglandular density.     Note:  It has been reported that there is approximately a 15% false  negative in mammography.  Therefore,  management of a palpable  abnormality should not be deferred because of a negative mammogram.                Objective     Past Medical History:   Diagnosis Date    Aftercare 2018    FOLLOWING EXCISION OF LEFT ANTEROLATERAL ANKLY CYST 2018    Aftercare 2018    FOLLOWING LEFT ANKLE CYST EXCISION    Mass of ankle 2018    Vein disorder        Past Surgical History:   Procedure Laterality Date    BARIATRIC SURGERY  2013    CYST REMOVAL Right     ANKLE    GASTRIC BYPASS  2013    HYSTERECTOMY  2015    PARTIAL     KNEE ACL RECONSTRUCTION Left          Current Outpatient Medications:     cephalexin (KEFLEX) 500 MG capsule, Take 1 capsule by mouth 4 (Four) Times a Day for 7 days., Disp: 28 capsule, Rfl: 0    phentermine 37.5 MG capsule, Take 1 capsule by mouth Every Morning., Disp: 30 capsule, Rfl: 0    rizatriptan (Maxalt) 10 MG tablet, Take 1 tablet by mouth 1 (One) Time As Needed for Migraine (May repeat dose if migraine is not resolved in 2 hrs). May repeat in 2 hours if needed, Disp: 8 tablet, Rfl: 1    Allergies   Allergen Reactions    Hydrocodone GI Intolerance        Family History   Problem Relation Age of Onset    Stroke Father     Skin cancer Maternal Grandmother     Heart disease Maternal Grandfather     Diabetes Maternal Grandfather     Heart disease Paternal Grandfather     Colon cancer Neg Hx        Social History     Socioeconomic History    Marital status:    Tobacco Use    Smoking status: Former     Current packs/day: 0.00     Average packs/day: 0.5 packs/day for 17.0 years (8.5 ttl pk-yrs)     Types: Cigarettes     Start date:      Quit date:      Years since quittin.6     Passive exposure: Never    Smokeless tobacco: Never    Tobacco comments:     AGES [age]   Vaping Use    Vaping status: Never Used   Substance and Sexual Activity    Alcohol use: Yes     Alcohol/week: 0.0 standard drinks of alcohol     Comment: OCCASIONALLY    Drug use: Never     "Sexual activity: Yes     Partners: Male     Birth control/protection: Surgical     Comment: full hysterctomy        Vital Signs:   Resp 17   Ht 165.1 cm (65\")   Wt 90.3 kg (199 lb)   BMI 33.12 kg/m²    Review of Systems    Physical Exam  Vitals and nursing note reviewed.   Constitutional:       Appearance: Normal appearance.   HENT:      Head: Normocephalic and atraumatic.   Eyes:      Extraocular Movements: Extraocular movements intact.      Pupils: Pupils are equal, round, and reactive to light.   Cardiovascular:      Pulses: Normal pulses.   Pulmonary:      Effort: Pulmonary effort is normal. No accessory muscle usage or respiratory distress.   Chest:   Breasts:     Right: Normal. Mass present. No inverted nipple, nipple discharge or skin change.      Left: Normal. No inverted nipple, nipple discharge or skin change.      Comments: 3 cm palpable breast fluid collection  Abdominal:      General: Abdomen is flat.      Palpations: Abdomen is soft.      Tenderness: There is no abdominal tenderness. There is no guarding.   Musculoskeletal:         General: No swelling, tenderness or deformity.      Cervical back: Neck supple.   Lymphadenopathy:      Upper Body:      Right upper body: No supraclavicular or axillary adenopathy.      Left upper body: No supraclavicular or axillary adenopathy.   Skin:     General: Skin is warm and dry.   Neurological:      General: No focal deficit present.      Mental Status: She is alert and oriented to person, place, and time.   Psychiatric:         Mood and Affect: Mood normal.         Thought Content: Thought content normal.          Result Review :               Assessment and Plan    Diagnoses and all orders for this visit:    1. Breast abscess (Primary)  -     US Guided Abscess Drain Breast Right; Future  -     Non-gynecologic Cytology; Standing  -     Body Fluid Culture - Aspirate, Breast, Right; Standing    Will plan for aspiration of breast abscess    Follow Up   Return for " Followup after imaging study complete.  Patient was given instructions and counseling regarding her condition or for health maintenance advice. Please see specific information pulled into the AVS if appropriate.         This document has been electronically signed by Jen Bansal MD  August 21, 2024 10:00 EDT

## 2024-08-22 ENCOUNTER — OFFICE VISIT (OUTPATIENT)
Dept: FAMILY MEDICINE CLINIC | Facility: CLINIC | Age: 44
End: 2024-08-22
Payer: OTHER GOVERNMENT

## 2024-08-22 VITALS
HEIGHT: 65 IN | BODY MASS INDEX: 33.32 KG/M2 | WEIGHT: 200 LBS | DIASTOLIC BLOOD PRESSURE: 65 MMHG | OXYGEN SATURATION: 100 % | HEART RATE: 76 BPM | SYSTOLIC BLOOD PRESSURE: 106 MMHG

## 2024-08-22 DIAGNOSIS — G43.801 OTHER MIGRAINE WITH STATUS MIGRAINOSUS, NOT INTRACTABLE: ICD-10-CM

## 2024-08-22 DIAGNOSIS — E66.09 CLASS 1 OBESITY DUE TO EXCESS CALORIES WITHOUT SERIOUS COMORBIDITY WITH BODY MASS INDEX (BMI) OF 34.0 TO 34.9 IN ADULT: Primary | ICD-10-CM

## 2024-08-22 PROCEDURE — 99213 OFFICE O/P EST LOW 20 MIN: CPT | Performed by: NURSE PRACTITIONER

## 2024-08-22 RX ORDER — PHENTERMINE HYDROCHLORIDE 37.5 MG/1
37.5 CAPSULE ORAL EVERY MORNING
Qty: 30 CAPSULE | Refills: 0 | Status: SHIPPED | OUTPATIENT
Start: 2024-08-22

## 2024-08-22 NOTE — PROGRESS NOTES
Chief Complaint  Obesity, migraines    Subjective            Zaria Santacruz presents to [facility]  History of Present Illness  Pt here for f/u on migrainus and obesity.  PT is requesting a refill of Phentermine this will be refill #3.  Her start wt was 224lbs. Pt's weight today is 200 lbs. Pt has lost a total of 24 lbs so far. No issues or concerns at this time.     She needs to reschedule colon consult with Dr. Howell for screening colonoscopy. Pt was given number to office to r/s.         Past Medical History:   Diagnosis Date    Aftercare 2018    FOLLOWING EXCISION OF LEFT ANTEROLATERAL ANKLY CYST 2018    Aftercare 2018    FOLLOWING LEFT ANKLE CYST EXCISION    Mass of ankle 2018    Vein disorder        Allergies   Allergen Reactions    Hydrocodone GI Intolerance        Past Surgical History:   Procedure Laterality Date    BARIATRIC SURGERY  2013    CYST REMOVAL Right     ANKLE    GASTRIC BYPASS  2013    HYSTERECTOMY  2015    PARTIAL     KNEE ACL RECONSTRUCTION Left         Social History     Tobacco Use    Smoking status: Former     Current packs/day: 0.00     Average packs/day: 0.5 packs/day for 17.0 years (8.5 ttl pk-yrs)     Types: Cigarettes     Start date:      Quit date:      Years since quittin.6     Passive exposure: Never    Smokeless tobacco: Never    Tobacco comments:     AGES [age]   Substance Use Topics    Alcohol use: Yes     Alcohol/week: 0.0 standard drinks of alcohol     Comment: OCCASIONALLY       Family History   Problem Relation Age of Onset    Stroke Father     Skin cancer Maternal Grandmother     Heart disease Maternal Grandfather     Diabetes Maternal Grandfather     Heart disease Paternal Grandfather     Colon cancer Neg Hx         Current Outpatient Medications on File Prior to Visit   Medication Sig    cephalexin (KEFLEX) 500 MG capsule Take 1 capsule by mouth 4 (Four) Times a Day for 7 days.     "rizatriptan (Maxalt) 10 MG tablet Take 1 tablet by mouth 1 (One) Time As Needed for Migraine (May repeat dose if migraine is not resolved in 2 hrs). May repeat in 2 hours if needed    [DISCONTINUED] phentermine 37.5 MG capsule Take 1 capsule by mouth Every Morning.     No current facility-administered medications on file prior to visit.       Health Maintenance Due   Topic Date Due    COLORECTAL CANCER SCREENING  Never done       Objective     /65   Pulse 76   Ht 165.1 cm (65\")   Wt 90.7 kg (200 lb)   SpO2 100%   BMI 33.28 kg/m²       Physical Exam  Constitutional:       General: She is not in acute distress.     Appearance: Normal appearance. She is not ill-appearing.   HENT:      Head: Normocephalic and atraumatic.   Cardiovascular:      Rate and Rhythm: Normal rate and regular rhythm.      Heart sounds: Normal heart sounds. No murmur heard.  Pulmonary:      Effort: Pulmonary effort is normal. No respiratory distress.      Breath sounds: Normal breath sounds.   Chest:      Chest wall: No tenderness.   Abdominal:      General: Abdomen is flat. Bowel sounds are normal. There is no distension.      Palpations: Abdomen is soft. There is no mass.      Tenderness: There is no abdominal tenderness. There is no guarding.   Musculoskeletal:         General: No swelling or tenderness. Normal range of motion.      Cervical back: Normal range of motion and neck supple.   Skin:     General: Skin is warm and dry.      Findings: No rash.   Neurological:      General: No focal deficit present.      Mental Status: She is alert and oriented to person, place, and time. Mental status is at baseline.      Gait: Gait normal.   Psychiatric:         Mood and Affect: Mood normal.         Behavior: Behavior normal.         Thought Content: Thought content normal.         Judgment: Judgment normal.           Result Review :                           Assessment and Plan        Diagnoses and all orders for this visit:    1. Class 1 " obesity due to excess calories without serious comorbidity with body mass index (BMI) of 34.0 to 34.9 in adult (Primary)  Comments:  continue phentermine 37.5mg, encourage low calorie diet and daily carido as well  Orders:  -     phentermine 37.5 MG capsule; Take 1 capsule by mouth Every Morning.  Dispense: 30 capsule; Refill: 0    2. Other migraine with status migrainosus, not intractable  Comments:  stable on Maxalt 10mg prn, continue              Follow Up     Return in about 1 month (around 9/22/2024).    Patient was given instructions and counseling regarding her condition or for health maintenance advice. Please see specific information pulled into the AVS if appropriate.     Zaria Santacruz  reports that she quit smoking about 11 years ago. Her smoking use included cigarettes. She started smoking about 28 years ago. She has a 8.5 pack-year smoking history. She has never been exposed to tobacco smoke. She has never used smokeless tobacco.          NABIL Remy

## 2024-08-26 RX ORDER — FLUCONAZOLE 150 MG/1
150 TABLET ORAL ONCE
Qty: 1 TABLET | Refills: 0 | Status: SHIPPED | OUTPATIENT
Start: 2024-08-26 | End: 2024-08-26

## 2024-08-26 NOTE — TELEPHONE ENCOUNTER
From: Zaria Santacruz  To: Joanna Faulkner  Sent: 8/7/2024 5:41 AM EDT  Subject: Lump in breast    I just found a significant lump in my breast. Can you get fit me in to be seen? Thanks!

## 2024-09-19 ENCOUNTER — OFFICE VISIT (OUTPATIENT)
Dept: FAMILY MEDICINE CLINIC | Facility: CLINIC | Age: 44
End: 2024-09-19
Payer: OTHER GOVERNMENT

## 2024-09-19 VITALS
DIASTOLIC BLOOD PRESSURE: 70 MMHG | WEIGHT: 193 LBS | SYSTOLIC BLOOD PRESSURE: 99 MMHG | HEART RATE: 74 BPM | BODY MASS INDEX: 32.15 KG/M2 | HEIGHT: 65 IN | OXYGEN SATURATION: 99 %

## 2024-09-19 DIAGNOSIS — G43.801 OTHER MIGRAINE WITH STATUS MIGRAINOSUS, NOT INTRACTABLE: ICD-10-CM

## 2024-09-19 DIAGNOSIS — E66.09 CLASS 1 OBESITY DUE TO EXCESS CALORIES WITHOUT SERIOUS COMORBIDITY WITH BODY MASS INDEX (BMI) OF 32.0 TO 32.9 IN ADULT: Primary | ICD-10-CM

## 2024-09-19 PROCEDURE — 99213 OFFICE O/P EST LOW 20 MIN: CPT | Performed by: NURSE PRACTITIONER

## 2024-09-19 RX ORDER — PHENTERMINE HYDROCHLORIDE 37.5 MG/1
37.5 CAPSULE ORAL EVERY MORNING
Qty: 30 CAPSULE | Refills: 0 | Status: SHIPPED | OUTPATIENT
Start: 2024-09-19

## 2024-09-24 ENCOUNTER — HOSPITAL ENCOUNTER (EMERGENCY)
Facility: HOSPITAL | Age: 44
Discharge: HOME | End: 2024-09-24
Attending: EMERGENCY MEDICINE | Admitting: EMERGENCY MEDICINE
Payer: OTHER GOVERNMENT

## 2024-09-24 ENCOUNTER — APPOINTMENT (OUTPATIENT)
Dept: GENERAL RADIOLOGY | Facility: HOSPITAL | Age: 44
End: 2024-09-24
Payer: OTHER GOVERNMENT

## 2024-09-24 VITALS
HEART RATE: 90 BPM | DIASTOLIC BLOOD PRESSURE: 72 MMHG | SYSTOLIC BLOOD PRESSURE: 114 MMHG | BODY MASS INDEX: 30.78 KG/M2 | RESPIRATION RATE: 18 BRPM | TEMPERATURE: 98.8 F | WEIGHT: 184.75 LBS | HEIGHT: 65 IN | OXYGEN SATURATION: 92 %

## 2024-09-24 DIAGNOSIS — J02.0 STREP PHARYNGITIS: Primary | ICD-10-CM

## 2024-09-24 LAB
ALBUMIN SERPL-MCNC: 3.7 G/DL (ref 3.5–5.2)
ALBUMIN/GLOB SERPL: 1 G/DL
ALP SERPL-CCNC: 83 U/L (ref 39–117)
ALT SERPL W P-5'-P-CCNC: 13 U/L (ref 1–33)
ANION GAP SERPL CALCULATED.3IONS-SCNC: 12.2 MMOL/L (ref 5–15)
AST SERPL-CCNC: 18 U/L (ref 1–32)
BASOPHILS # BLD AUTO: 0.08 10*3/MM3 (ref 0–0.2)
BASOPHILS NFR BLD AUTO: 0.5 % (ref 0–1.5)
BILIRUB SERPL-MCNC: 0.6 MG/DL (ref 0–1.2)
BUN SERPL-MCNC: 7 MG/DL (ref 6–20)
BUN/CREAT SERPL: 8.8 (ref 7–25)
CALCIUM SPEC-SCNC: 9.3 MG/DL (ref 8.6–10.5)
CHLORIDE SERPL-SCNC: 103 MMOL/L (ref 98–107)
CO2 SERPL-SCNC: 23.8 MMOL/L (ref 22–29)
CREAT SERPL-MCNC: 0.8 MG/DL (ref 0.57–1)
DEPRECATED RDW RBC AUTO: 42.4 FL (ref 37–54)
EGFRCR SERPLBLD CKD-EPI 2021: 92.2 ML/MIN/1.73
EOSINOPHIL # BLD AUTO: 0.11 10*3/MM3 (ref 0–0.4)
EOSINOPHIL NFR BLD AUTO: 0.7 % (ref 0.3–6.2)
ERYTHROCYTE [DISTWIDTH] IN BLOOD BY AUTOMATED COUNT: 13 % (ref 12.3–15.4)
FLUAV SUBTYP SPEC NAA+PROBE: NOT DETECTED
FLUBV RNA ISLT QL NAA+PROBE: NOT DETECTED
GLOBULIN UR ELPH-MCNC: 3.7 GM/DL
GLUCOSE SERPL-MCNC: 87 MG/DL (ref 65–99)
HCT VFR BLD AUTO: 42 % (ref 34–46.6)
HGB BLD-MCNC: 13.9 G/DL (ref 12–15.9)
HOLD SPECIMEN: NORMAL
HOLD SPECIMEN: NORMAL
IMM GRANULOCYTES # BLD AUTO: 0.09 10*3/MM3 (ref 0–0.05)
IMM GRANULOCYTES NFR BLD AUTO: 0.5 % (ref 0–0.5)
LYMPHOCYTES # BLD AUTO: 1.94 10*3/MM3 (ref 0.7–3.1)
LYMPHOCYTES NFR BLD AUTO: 11.5 % (ref 19.6–45.3)
MAGNESIUM SERPL-MCNC: 2 MG/DL (ref 1.6–2.6)
MCH RBC QN AUTO: 29.8 PG (ref 26.6–33)
MCHC RBC AUTO-ENTMCNC: 33.1 G/DL (ref 31.5–35.7)
MCV RBC AUTO: 90.1 FL (ref 79–97)
MONOCYTES # BLD AUTO: 1.08 10*3/MM3 (ref 0.1–0.9)
MONOCYTES NFR BLD AUTO: 6.4 % (ref 5–12)
NEUTROPHILS NFR BLD AUTO: 13.51 10*3/MM3 (ref 1.7–7)
NEUTROPHILS NFR BLD AUTO: 80.4 % (ref 42.7–76)
NRBC BLD AUTO-RTO: 0 /100 WBC (ref 0–0.2)
PLATELET # BLD AUTO: 266 10*3/MM3 (ref 140–450)
PMV BLD AUTO: 10 FL (ref 6–12)
POTASSIUM SERPL-SCNC: 3.7 MMOL/L (ref 3.5–5.2)
PROT SERPL-MCNC: 7.4 G/DL (ref 6–8.5)
RBC # BLD AUTO: 4.66 10*6/MM3 (ref 3.77–5.28)
RSV RNA NPH QL NAA+NON-PROBE: NOT DETECTED
S PYO AG THROAT QL: POSITIVE
SARS-COV-2 RNA RESP QL NAA+PROBE: NOT DETECTED
SODIUM SERPL-SCNC: 139 MMOL/L (ref 136–145)
TROPONIN T SERPL HS-MCNC: 12 NG/L
TSH SERPL DL<=0.05 MIU/L-ACNC: 1.64 UIU/ML (ref 0.27–4.2)
WBC NRBC COR # BLD AUTO: 16.81 10*3/MM3 (ref 3.4–10.8)
WHOLE BLOOD HOLD COAG: NORMAL
WHOLE BLOOD HOLD SPECIMEN: NORMAL

## 2024-09-24 PROCEDURE — 93005 ELECTROCARDIOGRAM TRACING: CPT

## 2024-09-24 PROCEDURE — 87637 SARSCOV2&INF A&B&RSV AMP PRB: CPT | Performed by: REGISTERED NURSE

## 2024-09-24 PROCEDURE — 96372 THER/PROPH/DIAG INJ SC/IM: CPT

## 2024-09-24 PROCEDURE — 25010000002 KETOROLAC TROMETHAMINE PER 15 MG: Performed by: EMERGENCY MEDICINE

## 2024-09-24 PROCEDURE — 25010000002 ONDANSETRON PER 1 MG: Performed by: EMERGENCY MEDICINE

## 2024-09-24 PROCEDURE — 84484 ASSAY OF TROPONIN QUANT: CPT

## 2024-09-24 PROCEDURE — 93005 ELECTROCARDIOGRAM TRACING: CPT | Performed by: EMERGENCY MEDICINE

## 2024-09-24 PROCEDURE — 99284 EMERGENCY DEPT VISIT MOD MDM: CPT

## 2024-09-24 PROCEDURE — 93010 ELECTROCARDIOGRAM REPORT: CPT | Performed by: INTERNAL MEDICINE

## 2024-09-24 PROCEDURE — 83735 ASSAY OF MAGNESIUM: CPT

## 2024-09-24 PROCEDURE — 96374 THER/PROPH/DIAG INJ IV PUSH: CPT

## 2024-09-24 PROCEDURE — 84443 ASSAY THYROID STIM HORMONE: CPT

## 2024-09-24 PROCEDURE — 85025 COMPLETE CBC W/AUTO DIFF WBC: CPT

## 2024-09-24 PROCEDURE — 25010000002 PENICILLIN G BENZATHINE PER 1200000 UNITS: Performed by: EMERGENCY MEDICINE

## 2024-09-24 PROCEDURE — 80053 COMPREHEN METABOLIC PANEL: CPT

## 2024-09-24 PROCEDURE — 87880 STREP A ASSAY W/OPTIC: CPT | Performed by: REGISTERED NURSE

## 2024-09-24 PROCEDURE — 96375 TX/PRO/DX INJ NEW DRUG ADDON: CPT

## 2024-09-24 PROCEDURE — 71045 X-RAY EXAM CHEST 1 VIEW: CPT

## 2024-09-24 PROCEDURE — 36415 COLL VENOUS BLD VENIPUNCTURE: CPT

## 2024-09-24 PROCEDURE — 25810000003 SODIUM CHLORIDE 0.9 % SOLUTION: Performed by: REGISTERED NURSE

## 2024-09-24 RX ORDER — ONDANSETRON 2 MG/ML
4 INJECTION INTRAMUSCULAR; INTRAVENOUS ONCE
Status: COMPLETED | OUTPATIENT
Start: 2024-09-24 | End: 2024-09-24

## 2024-09-24 RX ORDER — KETOROLAC TROMETHAMINE 30 MG/ML
30 INJECTION, SOLUTION INTRAMUSCULAR; INTRAVENOUS ONCE
Status: COMPLETED | OUTPATIENT
Start: 2024-09-24 | End: 2024-09-24

## 2024-09-24 RX ORDER — ONDANSETRON 4 MG/1
4 TABLET, ORALLY DISINTEGRATING ORAL 4 TIMES DAILY PRN
Qty: 10 TABLET | Refills: 0 | Status: SHIPPED | OUTPATIENT
Start: 2024-09-24

## 2024-09-24 RX ORDER — SODIUM CHLORIDE 0.9 % (FLUSH) 0.9 %
10 SYRINGE (ML) INJECTION AS NEEDED
Status: DISCONTINUED | OUTPATIENT
Start: 2024-09-24 | End: 2024-09-24 | Stop reason: HOSPADM

## 2024-09-24 RX ORDER — NAPROXEN 500 MG/1
500 TABLET ORAL 2 TIMES DAILY WITH MEALS
Qty: 14 TABLET | Refills: 0 | Status: SHIPPED | OUTPATIENT
Start: 2024-09-24

## 2024-09-24 RX ORDER — IBUPROFEN 600 MG/1
600 TABLET, FILM COATED ORAL ONCE
Status: COMPLETED | OUTPATIENT
Start: 2024-09-24 | End: 2024-09-24

## 2024-09-24 RX ADMIN — KETOROLAC TROMETHAMINE 30 MG: 30 INJECTION, SOLUTION INTRAMUSCULAR; INTRAVENOUS at 19:47

## 2024-09-24 RX ADMIN — ONDANSETRON 4 MG: 2 INJECTION INTRAMUSCULAR; INTRAVENOUS at 19:47

## 2024-09-24 RX ADMIN — SODIUM CHLORIDE 1000 ML: 9 INJECTION, SOLUTION INTRAVENOUS at 19:30

## 2024-09-24 RX ADMIN — IBUPROFEN 600 MG: 600 TABLET, FILM COATED ORAL at 19:26

## 2024-09-24 RX ADMIN — PENICILLIN G BENZATHINE 1.2 MILLION UNITS: 1200000 INJECTION, SUSPENSION INTRAMUSCULAR at 19:47

## 2024-09-24 NOTE — Clinical Note
[facility] EMERGENCY ROOM  [address] [place] [address]  Phone: [phone]  Fax: [phone]    Zaria Santacruz was seen and treated in our emergency department on 9/24/2024.  She may return to work on 09/26/2024.         Thank you for choosing [facility].    Radhames Lozoya MD

## 2024-09-24 NOTE — ED PROVIDER NOTES
Time: 7:03 PM EDT  Date of encounter:  9/24/2024  Independent Historian/Clinical History and Information was obtained by:   Patient    History is limited by: N/A    Chief Complaint   Patient presents with    Fatigue    Generalized Body Aches         History of Present Illness:  Patient is a 44 y.o. year old female who presents to the emergency department for evaluation of sore throat, body ache, nausea, headache, joint pain for several days.  Patient is a teacher.    Patient Care Team  Primary Care Provider: Joanna Faulkner APRN    Past Medical History:     Allergies   Allergen Reactions    Hydrocodone GI Intolerance     Past Medical History:   Diagnosis Date    Aftercare 04/23/2018    FOLLOWING EXCISION OF LEFT ANTEROLATERAL ANKLY CYST 4/11/2018    Aftercare 04/27/2018    FOLLOWING LEFT ANKLE CYST EXCISION    Mass of ankle 03/29/2018    Vein disorder      Past Surgical History:   Procedure Laterality Date    BARIATRIC SURGERY  November 2013    CYST REMOVAL Right 1998    ANKLE    GASTRIC BYPASS  2013    HYSTERECTOMY  2015    PARTIAL     KNEE ACL RECONSTRUCTION Left 1999     Family History   Problem Relation Age of Onset    Stroke Father     Skin cancer Maternal Grandmother     Heart disease Maternal Grandfather     Diabetes Maternal Grandfather     Heart disease Paternal Grandfather     Colon cancer Neg Hx        Home Medications:  Prior to Admission medications    Medication Sig Start Date End Date Taking? Authorizing Provider   phentermine 37.5 MG capsule Take 1 capsule by mouth Every Morning. 9/19/24   Joanna Faulkner APRN   rizatriptan (Maxalt) 10 MG tablet Take 1 tablet by mouth 1 (One) Time As Needed for Migraine (May repeat dose if migraine is not resolved in 2 hrs). May repeat in 2 hours if needed 7/8/24   Joanna Faulkner APRN        Social History:   Social History     Tobacco Use    Smoking status: Former     Current packs/day: 0.00     Average packs/day: 0.5 packs/day for 17.0 years (8.5 ttl pk-yrs)      "Types: Cigarettes     Start date:      Quit date: 2013     Years since quittin.7     Passive exposure: Never    Smokeless tobacco: Never    Tobacco comments:     AGES [age]   Vaping Use    Vaping status: Never Used   Substance Use Topics    Alcohol use: Yes     Alcohol/week: 0.0 standard drinks of alcohol     Comment: OCCASIONALLY    Drug use: Never         Review of Systems:  Review of Systems   Constitutional:  Positive for chills and fever.   HENT:  Positive for sore throat. Negative for congestion and ear pain.    Eyes:  Negative for pain.   Respiratory:  Negative for cough, chest tightness and shortness of breath.    Cardiovascular:  Negative for chest pain.   Gastrointestinal:  Negative for abdominal pain, diarrhea, nausea and vomiting.   Genitourinary:  Negative for flank pain and hematuria.   Musculoskeletal:  Positive for arthralgias and myalgias. Negative for joint swelling.   Skin:  Negative for pallor.   Neurological:  Negative for seizures and headaches.   All other systems reviewed and are negative.       Physical Exam:  /72 (BP Location: Left arm, Patient Position: Sitting)   Pulse 90   Temp 98.8 °F (37.1 °C) (Oral)   Resp 18   Ht 165.1 cm (65\")   Wt 83.8 kg (184 lb 11.9 oz)   SpO2 92%   BMI 30.74 kg/m²         Physical Exam  Vitals and nursing note reviewed.   Constitutional:       General: She is in acute distress.      Appearance: She is ill-appearing. She is not toxic-appearing.   HENT:      Head: Normocephalic and atraumatic.      Jaw: There is normal jaw occlusion.      Mouth/Throat:      Mouth: Mucous membranes are moist.      Pharynx: Oropharyngeal exudate and posterior oropharyngeal erythema present.      Comments: Soft palate is symmetric bilaterally erythematous and inflamed.  Bilateral tonsils show exudates.  Eyes:      General: Lids are normal.      Extraocular Movements: Extraocular movements intact.      Conjunctiva/sclera: Conjunctivae normal.      Pupils: Pupils " are equal, round, and reactive to light.   Cardiovascular:      Rate and Rhythm: Normal rate and regular rhythm.      Pulses: Normal pulses.      Heart sounds: Normal heart sounds.   Pulmonary:      Effort: Pulmonary effort is normal. No respiratory distress.      Breath sounds: Normal breath sounds. No wheezing or rhonchi.   Abdominal:      General: Abdomen is flat. There is no distension.      Palpations: Abdomen is soft.      Tenderness: There is no abdominal tenderness. There is no guarding or rebound.   Musculoskeletal:         General: Normal range of motion.      Cervical back: Normal range of motion and neck supple.      Right lower leg: No edema.      Left lower leg: No edema.   Lymphadenopathy:      Cervical: Cervical adenopathy present.   Skin:     General: Skin is warm and dry.   Neurological:      General: No focal deficit present.      Mental Status: She is alert and oriented to person, place, and time. Mental status is at baseline.   Psychiatric:         Mood and Affect: Mood normal.         Behavior: Behavior normal.            Procedures:  Procedures      Medical Decision Making:      Comorbidities that affect care:    Former tobacco use, alcohol use    External Notes reviewed:    Previous Clinic Note: Outpatient family medicine visit September 19 2024      The following orders were placed and all results were independently analyzed by me:  Orders Placed This Encounter   Procedures    COVID-19, FLU A/B, RSV PCR 1 HR TAT - Swab, Nasopharynx    Rapid Strep A Screen - Swab, Throat    XR Chest 1 View    [place] Draw    Comprehensive Metabolic Panel    Magnesium    Single High Sensitivity Troponin T    TSH    CBC Auto Differential    ECG 12 Lead ED Triage Standing Order; Dysrhythmia    CBC & Differential    Green Top (Gel)    Lavender Top    Gold Top - SST    Light Blue Top       Medications Given in the Emergency Department:  Medications   sodium chloride 0.9 % bolus 1,000 mL (0 mL Intravenous Stopped  9/24/24 2033)   ibuprofen (ADVIL,MOTRIN) tablet 600 mg (600 mg Oral Given 9/24/24 1926)   ondansetron (ZOFRAN) injection 4 mg (4 mg Intravenous Given 9/24/24 1947)   ketorolac (TORADOL) injection 30 mg (30 mg Intravenous Given 9/24/24 1947)   Penicillin G Benzathine (BICILLIN-LA) injection 1.2 Million Units (1.2 Million Units Intramuscular Given 9/24/24 1947)        ED Course:    The patient was initially evaluated in the triage area where orders were placed. The patient was later dispositioned by Radhames Lozoya MD.      The patient was advised to stay for completion of workup which includes but is not limited to communication of labs and radiological results, reassessment and plan. The patient was advised that leaving prior to disposition by a provider could result in critical findings that are not communicated to the patient.          Labs:    Lab Results (last 24 hours)       Procedure Component Value Units Date/Time    CBC & Differential [[id number]]  (Abnormal) Collected: 09/24/24 1814    Specimen: Blood Updated: 09/24/24 1827    Narrative:      The following orders were created for panel order CBC & Differential.  Procedure                               Abnormality         Status                     ---------                               -----------         ------                     CBC Auto Differential[[id number]]        Abnormal            Final result                 Please view results for these tests on the individual orders.    Comprehensive Metabolic Panel [[id number]] Collected: 09/24/24 1814    Specimen: Blood Updated: 09/24/24 1852     Glucose 87 mg/dL      BUN 7 mg/dL      Creatinine 0.80 mg/dL      Sodium 139 mmol/L      Potassium 3.7 mmol/L      Chloride 103 mmol/L      CO2 23.8 mmol/L      Calcium 9.3 mg/dL      Total Protein 7.4 g/dL      Albumin 3.7 g/dL      ALT (SGPT) 13 U/L      AST (SGOT) 18 U/L      Alkaline Phosphatase 83 U/L      Total Bilirubin 0.6 mg/dL      Globulin 3.7 gm/dL      A/G  Ratio 1.0 g/dL      BUN/Creatinine Ratio 8.8     Anion Gap 12.2 mmol/L      eGFR 92.2 mL/min/1.73     Narrative:      GFR Normal >60  Chronic Kidney Disease <60  Kidney Failure <15      Magnesium [[id number]]  (Normal) Collected: 09/24/24 1814    Specimen: Blood Updated: 09/24/24 1852     Magnesium 2.0 mg/dL     Single High Sensitivity Troponin T [[id number]]  (Normal) Collected: 09/24/24 1814    Specimen: Blood Updated: 09/24/24 1854     HS Troponin T 12 ng/L     Narrative:      High Sensitive Troponin T Reference Range:  <14.0 ng/L- Negative Female for AMI  <22.0 ng/L- Negative Male for AMI  >=14 - Abnormal Female indicating possible myocardial injury.  >=22 - Abnormal Male indicating possible myocardial injury.   Clinicians would have to utilize clinical acumen, EKG, Troponin, and serial changes to determine if it is an Acute Myocardial Infarction or myocardial injury due to an underlying chronic condition.         TSH [[id number]]  (Normal) Collected: 09/24/24 1814    Specimen: Blood Updated: 09/24/24 1854     TSH 1.640 uIU/mL     CBC Auto Differential [[id number]]  (Abnormal) Collected: 09/24/24 1814    Specimen: Blood Updated: 09/24/24 1827     WBC 16.81 10*3/mm3      RBC 4.66 10*6/mm3      Hemoglobin 13.9 g/dL      Hematocrit 42.0 %      MCV 90.1 fL      MCH 29.8 pg      MCHC 33.1 g/dL      RDW 13.0 %      RDW-SD 42.4 fl      MPV 10.0 fL      Platelets 266 10*3/mm3      Neutrophil % 80.4 %      Lymphocyte % 11.5 %      Monocyte % 6.4 %      Eosinophil % 0.7 %      Basophil % 0.5 %      Immature Grans % 0.5 %      Neutrophils, Absolute 13.51 10*3/mm3      Lymphocytes, Absolute 1.94 10*3/mm3      Monocytes, Absolute 1.08 10*3/mm3      Eosinophils, Absolute 0.11 10*3/mm3      Basophils, Absolute 0.08 10*3/mm3      Immature Grans, Absolute 0.09 10*3/mm3      nRBC 0.0 /100 WBC     COVID-19, FLU A/B, RSV PCR 1 HR TAT - Swab, Nasopharynx [[id number]]  (Normal) Collected: 09/24/24 1827    Specimen: Swab from Nasopharynx  Updated: 09/24/24 1917     COVID19 Not Detected     Influenza A PCR Not Detected     Influenza B PCR Not Detected     RSV, PCR Not Detected    Narrative:      Fact sheet for providers: https://www.fda.gov/media/860426/download    Fact sheet for patients: https://www.fda.gov/media/847460/download    Test performed by PCR.    Rapid Strep A Screen - Swab, Throat [[id number]]  (Abnormal) Collected: 09/24/24 1827    Specimen: Swab from Throat Updated: 09/24/24 1901     Strep A Ag Positive             Imaging:    XR Chest 1 View    Result Date: 9/24/2024  XR CHEST 1 VW Date of Exam: 9/24/2024 6:22 PM EDT Indication: Dysrhythmia Triage Protocol. Fatigue and generalized body aches. Comparison: 6/17/2019 Findings: Cardiac and mediastinal contours are normal. Pulmonary vascularity is normal. The lungs are clear. No pneumothorax identified.     No acute cardiopulmonary findings. Electronically Signed: Fer Conklin MD  9/24/2024 6:32 PM EDT  Workstation ID: [id number]       Differential Diagnosis and Discussion:      Sore Throat: Differential diagnosis includes but is not limited to bacterial infection, viral infection, inhaled irritants, sinus drainage, thyroiditis, epiglottitis, and retropharyngeal abscess.  Viral syndrome: Differential diagnosis includes but is not limited to influenza, common cold, COVID-19, RSV, adenovirus, enteroviruses, herpes virus, hepatitis virus, measles, mumps, rubella, dengue fever, and possible bacterial infection.    All labs were reviewed and interpreted by me.  All X-rays impressions were independently interpreted by me.    [facility]                   Patient Care Considerations:    ANTIVIRAL: I considered prescribing antiviral medication as an outpatient, however no active viral infection      Consultants/Shared Management Plan:    None    Social Determinants of Health:    Patient has presented with family members who are responsible, reliable and will ensure follow up care.      Disposition and  Care Coordination:    Discharged: The patient is suitable and stable for discharge with no need for consideration of admission.    I have explained the patient´s condition, diagnoses and treatment plan based on the information available to me at this time. I have answered questions and addressed any concerns. The patient has a good  understanding of the patient´s diagnosis, condition, and treatment plan as can be expected at this point. The vital signs have been stable. The patient´s condition is stable and appropriate for discharge from the emergency department.      The patient will pursue further outpatient evaluation with the primary care physician or other designated or consulting physician as outlined in the discharge instructions. They are agreeable to this plan of care and follow-up instructions have been explained in detail. The patient has received these instructions in written format and has expressed an understanding of the discharge instructions. The patient is aware that any significant change in condition or worsening of symptoms should prompt an immediate return to this or the closest emergency department or call to 911.    Final diagnoses:   Strep pharyngitis        ED Disposition       ED Disposition   Discharge    Condition   Stable    Comment   --               This medical record created using voice recognition software.             Radhames Lozoya MD  09/25/24 0004

## 2024-09-30 LAB
QT INTERVAL: 353 MS
QTC INTERVAL: 433 MS

## 2024-10-31 ENCOUNTER — OFFICE VISIT (OUTPATIENT)
Dept: FAMILY MEDICINE CLINIC | Facility: CLINIC | Age: 44
End: 2024-10-31
Payer: OTHER GOVERNMENT

## 2024-10-31 ENCOUNTER — TELEPHONE (OUTPATIENT)
Dept: FAMILY MEDICINE CLINIC | Facility: CLINIC | Age: 44
End: 2024-10-31

## 2024-10-31 VITALS
OXYGEN SATURATION: 96 % | WEIGHT: 178.8 LBS | SYSTOLIC BLOOD PRESSURE: 102 MMHG | HEIGHT: 65 IN | DIASTOLIC BLOOD PRESSURE: 63 MMHG | BODY MASS INDEX: 29.79 KG/M2 | HEART RATE: 83 BPM

## 2024-10-31 DIAGNOSIS — G43.009 MIGRAINE WITHOUT AURA AND WITHOUT STATUS MIGRAINOSUS, NOT INTRACTABLE: ICD-10-CM

## 2024-10-31 DIAGNOSIS — E66.3 OVERWEIGHT (BMI 25.0-29.9): Primary | ICD-10-CM

## 2024-10-31 RX ORDER — PHENTERMINE HYDROCHLORIDE 37.5 MG/1
37.5 CAPSULE ORAL EVERY MORNING
Qty: 30 CAPSULE | Refills: 0 | Status: SHIPPED | OUTPATIENT
Start: 2024-10-31

## 2024-10-31 NOTE — TELEPHONE ENCOUNTER
Pt was inquiring about breast aspiration. Pt is scheduled 11/12/24@ 11 AM at the hospital. Pt is not to take any blood thinners (naproxen on med list) for 5 days prior to procedure.     TCB @ 046

## 2024-10-31 NOTE — PROGRESS NOTES
Chief Complaint  Overweight, migraines    Subjective            Zaria Santacruz presents to [facility]  History of Present Illness  Pt is a 1 mo f/u for migraines and being overweight. She is requesting phentermine refill #5. Pt's start weight was 224 lbs. Pt's weight today is 178 lbs. PT has lost a total 48 lbs total. No issues or concerns at this time.     Pt is due colon screening. Pt given number to office to r/s with Dr. Howell as referral was previously placed.    PT reports she is to have a abscess of her right breast drained with Dr. Lujan but has had trouble getting scheduled.  Will look into referral for pt.         Past Medical History:   Diagnosis Date    Aftercare 2018    FOLLOWING EXCISION OF LEFT ANTEROLATERAL ANKLY CYST 2018    Aftercare 2018    FOLLOWING LEFT ANKLE CYST EXCISION    Mass of ankle 2018    Vein disorder        Allergies   Allergen Reactions    Hydrocodone GI Intolerance        Past Surgical History:   Procedure Laterality Date    BARIATRIC SURGERY  2013    CYST REMOVAL Right     ANKLE    GASTRIC BYPASS      HYSTERECTOMY      PARTIAL     KNEE ACL RECONSTRUCTION Left         Social History     Tobacco Use    Smoking status: Former     Current packs/day: 0.00     Average packs/day: 0.5 packs/day for 17.0 years (8.5 ttl pk-yrs)     Types: Cigarettes     Start date:      Quit date:      Years since quittin.8     Passive exposure: Never    Smokeless tobacco: Never    Tobacco comments:     AGES [age]   Substance Use Topics    Alcohol use: Yes     Alcohol/week: 0.0 standard drinks of alcohol     Comment: OCCASIONALLY       Family History   Problem Relation Age of Onset    Stroke Father     Skin cancer Maternal Grandmother     Heart disease Maternal Grandfather     Diabetes Maternal Grandfather     Heart disease Paternal Grandfather     Colon cancer Neg Hx         Current Outpatient Medications on  "File Prior to Visit   Medication Sig    naproxen (EC NAPROSYN) 500 MG EC tablet Take 1 tablet by mouth 2 (Two) Times a Day With Meals.    ondansetron ODT (ZOFRAN-ODT) 4 MG disintegrating tablet Place 1 tablet on the tongue 4 (Four) Times a Day As Needed for Nausea.    rizatriptan (Maxalt) 10 MG tablet Take 1 tablet by mouth 1 (One) Time As Needed for Migraine (May repeat dose if migraine is not resolved in 2 hrs). May repeat in 2 hours if needed    [DISCONTINUED] phentermine 37.5 MG capsule Take 1 capsule by mouth Every Morning.     No current facility-administered medications on file prior to visit.       Health Maintenance Due   Topic Date Due    COLORECTAL CANCER SCREENING  Never done    COVID-19 Vaccine (3 - 2023-24 season) 09/01/2024       Objective     /63 (BP Location: Left arm, Patient Position: Sitting, Cuff Size: Large Adult)   Pulse 83   Ht 165.1 cm (65\")   Wt 81.1 kg (178 lb 12.8 oz)   SpO2 96%   BMI 29.75 kg/m²       Physical Exam  Constitutional:       General: She is not in acute distress.     Appearance: Normal appearance. She is not ill-appearing.   HENT:      Head: Normocephalic and atraumatic.   Cardiovascular:      Rate and Rhythm: Normal rate and regular rhythm.      Heart sounds: Normal heart sounds. No murmur heard.  Pulmonary:      Effort: Pulmonary effort is normal. No respiratory distress.      Breath sounds: Normal breath sounds.   Chest:      Chest wall: No tenderness.   Abdominal:      General: Abdomen is flat. Bowel sounds are normal. There is no distension.      Palpations: Abdomen is soft. There is no mass.      Tenderness: There is no abdominal tenderness. There is no guarding.   Musculoskeletal:         General: No swelling or tenderness. Normal range of motion.      Cervical back: Normal range of motion and neck supple.   Skin:     General: Skin is warm and dry.      Findings: No rash.   Neurological:      General: No focal deficit present.      Mental Status: She is " alert and oriented to person, place, and time. Mental status is at baseline.      Gait: Gait normal.   Psychiatric:         Mood and Affect: Mood normal.         Behavior: Behavior normal.         Thought Content: Thought content normal.         Judgment: Judgment normal.           Result Review :                           Assessment and Plan        Diagnoses and all orders for this visit:    1. Overweight (BMI 25.0-29.9) (Primary)  Comments:  stable on Phentermine 37.5mg, continue, encouraged low calorie diet and daily cardio exercise as well  Orders:  -     phentermine 37.5 MG capsule; Take 1 capsule by mouth Every Morning.  Dispense: 30 capsule; Refill: 0    2. Migraine without aura and without status migrainosus, not intractable  Comments:  stable on Maxalt 10mg prn, continue              Follow Up     Return in about 1 month (around 11/30/2024).    Patient was given instructions and counseling regarding her condition or for health maintenance advice. Please see specific information pulled into the AVS if appropriate.     Zaria Santacruz  reports that she quit smoking about 11 years ago. Her smoking use included cigarettes. She started smoking about 28 years ago. She has a 8.5 pack-year smoking history. She has never been exposed to tobacco smoke. She has never used smokeless tobacco.

## 2024-11-12 ENCOUNTER — PATIENT OUTREACH (OUTPATIENT)
Dept: ONCOLOGY | Facility: HOSPITAL | Age: 44
End: 2024-11-12
Payer: OTHER GOVERNMENT

## 2024-11-12 ENCOUNTER — HOSPITAL ENCOUNTER (OUTPATIENT)
Dept: MAMMOGRAPHY | Facility: HOSPITAL | Age: 44
Discharge: HOME | End: 2024-11-12
Payer: OTHER GOVERNMENT

## 2024-11-12 DIAGNOSIS — N61.1 BREAST ABSCESS: ICD-10-CM

## 2024-11-12 DIAGNOSIS — N61.1 BREAST ABSCESS: Primary | ICD-10-CM

## 2024-11-12 PROCEDURE — 19081 BX BREAST 1ST LESION STRTCTC: CPT

## 2024-11-12 RX ORDER — LIDOCAINE HYDROCHLORIDE 10 MG/ML
INJECTION, SOLUTION INFILTRATION; PERINEURAL
Status: DISPENSED
Start: 2024-11-12 | End: 2024-11-12

## 2024-11-13 ENCOUNTER — PATIENT OUTREACH (OUTPATIENT)
Dept: ONCOLOGY | Facility: HOSPITAL | Age: 44
End: 2024-11-13
Payer: OTHER GOVERNMENT

## 2024-11-27 ENCOUNTER — HOSPITAL ENCOUNTER (OUTPATIENT)
Dept: MRI IMAGING | Facility: HOSPITAL | Age: 44
Discharge: HOME | End: 2024-11-27
Admitting: SURGERY
Payer: OTHER GOVERNMENT

## 2024-11-27 DIAGNOSIS — N61.1 BREAST ABSCESS: ICD-10-CM

## 2024-11-27 PROCEDURE — 25510000002 GADOBENATE DIMEGLUMINE 529 MG/ML SOLUTION: Performed by: SURGERY

## 2024-11-27 PROCEDURE — A9577 INJ MULTIHANCE: HCPCS | Performed by: SURGERY

## 2024-11-27 PROCEDURE — 77049 MRI BREAST C-+ W/CAD BI: CPT

## 2024-11-27 RX ADMIN — GADOBENATE DIMEGLUMINE 15 ML: 529 INJECTION, SOLUTION INTRAVENOUS at 13:35

## 2025-03-19 ENCOUNTER — TELEMEDICINE (OUTPATIENT)
Dept: FAMILY MEDICINE CLINIC | Facility: TELEHEALTH | Age: 45
End: 2025-03-19
Payer: OTHER GOVERNMENT

## 2025-03-19 DIAGNOSIS — J01.00 ACUTE MAXILLARY SINUSITIS, RECURRENCE NOT SPECIFIED: Primary | ICD-10-CM

## 2025-03-19 RX ORDER — DOXYCYCLINE 100 MG/1
100 CAPSULE ORAL 2 TIMES DAILY
Qty: 14 CAPSULE | Refills: 0 | Status: SHIPPED | OUTPATIENT
Start: 2025-03-19 | End: 2025-03-26

## 2025-03-19 RX ORDER — FLUCONAZOLE 150 MG/1
150 TABLET ORAL
Qty: 2 TABLET | Refills: 0 | Status: SHIPPED | OUTPATIENT
Start: 2025-03-19 | End: 2025-03-23

## 2025-03-19 NOTE — PROGRESS NOTES
CHIEF COMPLAINT  Chief Complaint   Patient presents with    Sinusitis    Cough         HPI  Zaria Santacruz is a 44 y.o. female  presents with complaint of sinus infection resulting from a viral illness. She has been sick all week and now has facial pain and a cough.   She has been at the hospital with her daughter who has the virus and ended up in the hospital.     Review of Systems   Constitutional:  Positive for fatigue. Negative for chills, diaphoresis and fever.   HENT:  Positive for congestion, postnasal drip, sinus pressure and sinus pain. Negative for rhinorrhea, sneezing and sore throat.    Respiratory:  Positive for cough (congested and productive). Negative for chest tightness, shortness of breath and wheezing.    Cardiovascular:  Negative for chest pain.   Gastrointestinal:  Negative for diarrhea, nausea and vomiting.   Musculoskeletal:  Negative for myalgias.   Neurological:  Positive for headaches.       Past Medical History:   Diagnosis Date    Aftercare 2018    FOLLOWING EXCISION OF LEFT ANTEROLATERAL ANKLY CYST 2018    Aftercare 2018    FOLLOWING LEFT ANKLE CYST EXCISION    Mass of ankle 2018    Vein disorder        Family History   Problem Relation Age of Onset    Stroke Father     Skin cancer Maternal Grandmother     Heart disease Maternal Grandfather     Diabetes Maternal Grandfather     Heart disease Paternal Grandfather     Colon cancer Neg Hx        Social History     Socioeconomic History    Marital status:    Tobacco Use    Smoking status: Former     Current packs/day: 0.00     Average packs/day: 0.5 packs/day for 17.0 years (8.5 ttl pk-yrs)     Types: Cigarettes     Start date:      Quit date: 2013     Years since quittin.2     Passive exposure: Never    Smokeless tobacco: Never    Tobacco comments:     AGES [age]   Vaping Use    Vaping status: Never Used   Substance and Sexual Activity    Alcohol use: Yes     Alcohol/week: 0.0 standard drinks of alcohol      Comment: OCCASIONALLY    Drug use: Never    Sexual activity: Yes     Partners: Male     Birth control/protection: Surgical     Comment: full hysterctomy          There were no vitals taken for this visit.    PHYSICAL EXAM  Physical Exam   Constitutional: She is oriented to person, place, and time. She appears well-developed and well-nourished. She does not have a sickly appearance. She does not appear ill. No distress.   HENT:   Head: Normocephalic and atraumatic.   Nose: Right sinus exhibits maxillary sinus tenderness. Right sinus exhibits no frontal sinus tenderness. Left sinus exhibits maxillary sinus tenderness. Left sinus exhibits no frontal sinus tenderness.   Eyes: EOM are normal.   Neck: Neck normal appearance.  Pulmonary/Chest: Effort normal.  No respiratory distress.  Neurological: She is alert and oriented to person, place, and time.   Skin: Skin is dry.   Psychiatric: She has a normal mood and affect.           Diagnoses and all orders for this visit:    1. Acute maxillary sinusitis, recurrence not specified (Primary)    Other orders  -     doxycycline (VIBRAMYCIN) 100 MG capsule; Take 1 capsule by mouth 2 (Two) Times a Day for 7 days.  Dispense: 14 capsule; Refill: 0  -     fluconazole (Diflucan) 150 MG tablet; Take 1 tablet by mouth Every 3 (Three) Days for 2 doses.  Dispense: 2 tablet; Refill: 0        Mode of visit: Video   Myself and Zaria Santacruz participated in this visit. The patient is located in [address] [place] [address]. I am located in [place], [place]. Mychart and [facility] were utilized.   You have chosen to receive care through a telehealth visit.     Does the patient consent to use a video/audio connection for your medical care today? Yes       Note Disclaimer: At [facility], we believe that sharing information builds trust and better   relationships. You are receiving this note because you recently visited [facility]. It is possible you   will see health information  before a provider has talked with you about it. This kind of information can   be easy to misunderstand. To help you fully understand what it means for your health, we urge you to   discuss this note with your provider.    NABIL Akers  03/19/2025  12:49 EDT

## 2025-05-20 ENCOUNTER — PATIENT MESSAGE (OUTPATIENT)
Dept: FAMILY MEDICINE CLINIC | Facility: CLINIC | Age: 45
End: 2025-05-20
Payer: OTHER GOVERNMENT

## 2025-05-28 ENCOUNTER — OFFICE VISIT (OUTPATIENT)
Dept: FAMILY MEDICINE CLINIC | Facility: CLINIC | Age: 45
End: 2025-05-28
Payer: OTHER GOVERNMENT

## 2025-05-28 VITALS
HEART RATE: 58 BPM | HEIGHT: 65 IN | SYSTOLIC BLOOD PRESSURE: 115 MMHG | WEIGHT: 168 LBS | OXYGEN SATURATION: 99 % | DIASTOLIC BLOOD PRESSURE: 68 MMHG | BODY MASS INDEX: 27.99 KG/M2

## 2025-05-28 DIAGNOSIS — Z12.11 SCREENING FOR COLON CANCER: ICD-10-CM

## 2025-05-28 DIAGNOSIS — Z00.00 ANNUAL PHYSICAL EXAM: Primary | ICD-10-CM

## 2025-05-28 DIAGNOSIS — Z13.220 SCREENING FOR CHOLESTEROL LEVEL: ICD-10-CM

## 2025-05-28 DIAGNOSIS — F33.0 MAJOR DEPRESSIVE DISORDER, RECURRENT, MILD: ICD-10-CM

## 2025-05-28 DIAGNOSIS — Z13.29 SCREENING FOR THYROID DISORDER: ICD-10-CM

## 2025-05-28 DIAGNOSIS — F41.9 ANXIETY: ICD-10-CM

## 2025-05-28 RX ORDER — ESCITALOPRAM OXALATE 20 MG/1
TABLET ORAL
Qty: 90 TABLET | Refills: 0 | Status: SHIPPED | OUTPATIENT
Start: 2025-05-28

## 2025-06-18 ENCOUNTER — TELEPHONE (OUTPATIENT)
Dept: FAMILY MEDICINE CLINIC | Facility: CLINIC | Age: 45
End: 2025-06-18
Payer: OTHER GOVERNMENT

## 2025-06-18 NOTE — TELEPHONE ENCOUNTER
Caller: Zaria Santacruz    Relationship to patient: Self    Best call back number: [phone]    Chief complaint: LUMP IN BREAST     Type of visit: OFFICE     Requested date: ANY DAY THIS WEEK     Additional notes: [facility] AVAILABLE WITH PCP NOT UNTIL THE END OF JUNE. PATIENT IS ASKING TO PLEASE BE SEEN SOONER DUE TO HER SYMPTOMS.

## 2025-06-24 ENCOUNTER — OFFICE VISIT (OUTPATIENT)
Dept: FAMILY MEDICINE CLINIC | Facility: CLINIC | Age: 45
End: 2025-06-24
Payer: OTHER GOVERNMENT

## 2025-06-24 VITALS
HEART RATE: 82 BPM | DIASTOLIC BLOOD PRESSURE: 67 MMHG | BODY MASS INDEX: 28.66 KG/M2 | SYSTOLIC BLOOD PRESSURE: 109 MMHG | WEIGHT: 172 LBS | HEIGHT: 65 IN | OXYGEN SATURATION: 99 %

## 2025-06-24 DIAGNOSIS — N63.20 MASS OF LEFT BREAST, UNSPECIFIED QUADRANT: ICD-10-CM

## 2025-06-24 DIAGNOSIS — F33.0 MAJOR DEPRESSIVE DISORDER, RECURRENT, MILD: Primary | ICD-10-CM

## 2025-06-24 PROCEDURE — 99214 OFFICE O/P EST MOD 30 MIN: CPT | Performed by: NURSE PRACTITIONER

## 2025-06-24 NOTE — PROGRESS NOTES
Chief Complaint  Breast Problem and Depression    Subjective            Zaria Sanatcruz presents to [facility]  History of Present Illness  Pt is a f/u for depression. Pt was started on lexapro 20 mg. Pt states this medication is working great. No adjustments needed at this time.     Pt has c/o breast lump. Pt states she feels a soft lump in the (L) breast at approximately the 10 o'clock position. Pt states this is slightly painful but pain is getting better. Pt first noticed this 2 wks ago.         Past Medical History:   Diagnosis Date    Aftercare 2018    FOLLOWING EXCISION OF LEFT ANTEROLATERAL ANKLY CYST 2018    Aftercare 2018    FOLLOWING LEFT ANKLE CYST EXCISION    Mass of ankle 2018    Vein disorder        Allergies   Allergen Reactions    Hydrocodone GI Intolerance        Past Surgical History:   Procedure Laterality Date    BARIATRIC SURGERY  2013    CYST REMOVAL Right     ANKLE    GASTRIC BYPASS  2013    HYSTERECTOMY  2015    PARTIAL     KNEE ACL RECONSTRUCTION Left         Social History     Tobacco Use    Smoking status: Former     Current packs/day: 0.00     Average packs/day: 0.5 packs/day for 17.0 years (8.5 ttl pk-yrs)     Types: Cigarettes     Start date:      Quit date:      Years since quittin.4     Passive exposure: Never    Smokeless tobacco: Never    Tobacco comments:     AGES [age]   Substance Use Topics    Alcohol use: Yes     Alcohol/week: 0.0 standard drinks of alcohol     Comment: OCCASIONALLY       Family History   Problem Relation Age of Onset    Stroke Father     Skin cancer Maternal Grandmother     Heart disease Maternal Grandfather     Diabetes Maternal Grandfather     Heart disease Paternal Grandfather     Colon cancer Neg Hx         Current Outpatient Medications on File Prior to Visit   Medication Sig    escitalopram (Lexapro) 20 MG tablet Take 1/2 tab (10mg) daily for 2 weeks then 1 tab 20mg  "daily (Patient taking differently: Take 1 tablet by mouth Daily. Take 1/2 tab (10mg) daily for 2 weeks then 1 tab 20mg daily)    naproxen (EC NAPROSYN) 500 MG EC tablet Take 1 tablet by mouth 2 (Two) Times a Day With Meals.     No current facility-administered medications on file prior to visit.       Health Maintenance Due   Topic Date Due    COLORECTAL CANCER SCREENING  Never done       Objective     /67   Pulse 82   Ht 165.1 cm (65\")   Wt 78 kg (172 lb)   SpO2 99%   BMI 28.62 kg/m²       Physical Exam  Constitutional:       General: She is not in acute distress.     Appearance: Normal appearance. She is not ill-appearing.   HENT:      Head: Normocephalic and atraumatic.      Right Ear: Tympanic membrane, ear canal and external ear normal.      Left Ear: Tympanic membrane, ear canal and external ear normal.      Nose: Nose normal.   Cardiovascular:      Rate and Rhythm: Normal rate and regular rhythm.      Pulses: Normal pulses.      Heart sounds: Normal heart sounds. No murmur heard.  Pulmonary:      Effort: Pulmonary effort is normal. No respiratory distress.      Breath sounds: Normal breath sounds.   Chest:      Chest wall: No tenderness.   Breasts:     Right: Normal.      Left: Mass present.      Comments: Grape sized mass at approx 11:00 palpated in the left breast, slightly tender to touch  Musculoskeletal:         General: No swelling or tenderness. Normal range of motion.      Cervical back: Normal range of motion and neck supple.   Skin:     General: Skin is warm and dry.      Findings: No rash.   Neurological:      General: No focal deficit present.      Mental Status: She is alert and oriented to person, place, and time. Mental status is at baseline.      Gait: Gait normal.   Psychiatric:         Attention and Perception: Attention and perception normal.         Mood and Affect: Mood normal.         Speech: Speech normal.         Behavior: Behavior normal. Behavior is cooperative.         " Thought Content: Thought content normal. Thought content does not include suicidal ideation.         Judgment: Judgment normal.           Result Review :                           Assessment and Plan        Diagnoses and all orders for this visit:    1. Major depressive disorder, recurrent, mild (Primary)  Comments:  stable on Lexapro 20mg, continue    2. Mass of left breast, unspecified quadrant  -     Mammo Diagnostic Digital Tomosynthesis Left With CAD; Future  -     US Breast Left Limited; Future              Follow Up     Return in about 6 months (around 12/24/2025), or if symptoms worsen or fail to improve.    Patient was given instructions and counseling regarding her condition or for health maintenance advice. Please see specific information pulled into the AVS if appropriate.     Zaria Santacruz  reports that she quit smoking about 12 years ago. Her smoking use included cigarettes. She started smoking about 29 years ago. She has a 8.5 pack-year smoking history. She has never been exposed to tobacco smoke. She has never used smokeless tobacco.     NABIL Remy

## 2025-06-27 ENCOUNTER — PATIENT MESSAGE (OUTPATIENT)
Dept: FAMILY MEDICINE CLINIC | Facility: CLINIC | Age: 45
End: 2025-06-27
Payer: OTHER GOVERNMENT

## 2025-06-27 DIAGNOSIS — Z12.31 ENCOUNTER FOR SCREENING MAMMOGRAM FOR MALIGNANT NEOPLASM OF BREAST: ICD-10-CM

## 2025-06-27 DIAGNOSIS — N63.20 MASS OF LEFT BREAST, UNSPECIFIED QUADRANT: Primary | ICD-10-CM

## 2025-06-27 NOTE — TELEPHONE ENCOUNTER
Comments    6/27/25 PER PROTOCOL PATIENT IS DUE FOR SCREENING MAMMO. PLEASE UPDATE ORDER TO BILATERAL DX MAMMO TO SCHEDULE. ROUTING BACK TO [facility].

## 2025-07-14 ENCOUNTER — HOSPITAL ENCOUNTER (OUTPATIENT)
Dept: ULTRASOUND IMAGING | Facility: HOSPITAL | Age: 45
Discharge: HOME | End: 2025-07-14
Payer: OTHER GOVERNMENT

## 2025-07-14 ENCOUNTER — HOSPITAL ENCOUNTER (OUTPATIENT)
Dept: MAMMOGRAPHY | Facility: HOSPITAL | Age: 45
Discharge: HOME | End: 2025-07-14
Payer: OTHER GOVERNMENT

## 2025-07-14 DIAGNOSIS — N63.20 MASS OF LEFT BREAST, UNSPECIFIED QUADRANT: ICD-10-CM

## 2025-07-14 DIAGNOSIS — Z12.31 ENCOUNTER FOR SCREENING MAMMOGRAM FOR MALIGNANT NEOPLASM OF BREAST: ICD-10-CM

## 2025-07-14 PROCEDURE — G0279 TOMOSYNTHESIS, MAMMO: HCPCS

## 2025-07-14 PROCEDURE — 77066 DX MAMMO INCL CAD BI: CPT

## 2025-07-14 PROCEDURE — 76642 ULTRASOUND BREAST LIMITED: CPT

## 2025-07-16 ENCOUNTER — PATIENT MESSAGE (OUTPATIENT)
Dept: FAMILY MEDICINE CLINIC | Facility: CLINIC | Age: 45
End: 2025-07-16
Payer: OTHER GOVERNMENT

## 2025-07-16 RX ORDER — RIZATRIPTAN BENZOATE 10 MG/1
10 TABLET, ORALLY DISINTEGRATING ORAL ONCE AS NEEDED
Qty: 12 TABLET | Refills: 1 | Status: SHIPPED | OUTPATIENT
Start: 2025-07-16

## 2025-07-16 RX ORDER — RIZATRIPTAN BENZOATE 10 MG/1
10 TABLET, ORALLY DISINTEGRATING ORAL ONCE AS NEEDED
COMMUNITY
End: 2025-07-16 | Stop reason: SDUPTHER

## 2025-07-24 ENCOUNTER — LAB (OUTPATIENT)
Facility: HOSPITAL | Age: 45
End: 2025-07-24
Payer: OTHER GOVERNMENT

## 2025-07-24 ENCOUNTER — OFFICE VISIT (OUTPATIENT)
Dept: FAMILY MEDICINE CLINIC | Facility: CLINIC | Age: 45
End: 2025-07-24
Payer: OTHER GOVERNMENT

## 2025-07-24 VITALS
HEART RATE: 86 BPM | HEIGHT: 65 IN | BODY MASS INDEX: 29.66 KG/M2 | SYSTOLIC BLOOD PRESSURE: 112 MMHG | DIASTOLIC BLOOD PRESSURE: 70 MMHG | WEIGHT: 178 LBS | OXYGEN SATURATION: 96 %

## 2025-07-24 DIAGNOSIS — Z13.220 SCREENING FOR CHOLESTEROL LEVEL: ICD-10-CM

## 2025-07-24 DIAGNOSIS — Z20.2 STD EXPOSURE: Primary | ICD-10-CM

## 2025-07-24 DIAGNOSIS — Z13.29 SCREENING FOR THYROID DISORDER: ICD-10-CM

## 2025-07-24 DIAGNOSIS — Z20.2 STD EXPOSURE: ICD-10-CM

## 2025-07-24 DIAGNOSIS — Z00.00 ANNUAL PHYSICAL EXAM: ICD-10-CM

## 2025-07-24 LAB
ALBUMIN SERPL-MCNC: 3.7 G/DL (ref 3.5–5.2)
ALBUMIN/GLOB SERPL: 1 G/DL
ALP SERPL-CCNC: 103 U/L (ref 39–117)
ALT SERPL W P-5'-P-CCNC: 49 U/L (ref 1–33)
ANION GAP SERPL CALCULATED.3IONS-SCNC: 10.5 MMOL/L (ref 5–15)
ANISOCYTOSIS BLD QL: ABNORMAL
AST SERPL-CCNC: 60 U/L (ref 1–32)
BACTERIAL VAGINOSIS VAG-IMP: POSITIVE
BASOPHILS # BLD MANUAL: 0.17 10*3/MM3 (ref 0–0.2)
BASOPHILS NFR BLD MANUAL: 2 % (ref 0–1.5)
BILIRUB SERPL-MCNC: 0.4 MG/DL (ref 0–1.2)
BUN SERPL-MCNC: 13 MG/DL (ref 6–20)
BUN/CREAT SERPL: 17.3 (ref 7–25)
C TRACH DNA SPEC QL NAA+PROBE: NOT DETECTED
CALCIUM SPEC-SCNC: 9.3 MG/DL (ref 8.6–10.5)
CANDIDA DNA VAG QL NAA+PROBE: NOT DETECTED
CANDIDA DNA VAG QL NAA+PROBE: NOT DETECTED
CHLORIDE SERPL-SCNC: 101 MMOL/L (ref 98–107)
CO2 SERPL-SCNC: 25.5 MMOL/L (ref 22–29)
CREAT SERPL-MCNC: 0.75 MG/DL (ref 0.57–1)
DACRYOCYTES BLD QL SMEAR: ABNORMAL
DEPRECATED RDW RBC AUTO: 41.9 FL (ref 37–54)
EGFRCR SERPLBLD CKD-EPI 2021: 99 ML/MIN/1.73
EOSINOPHIL # BLD MANUAL: 0 10*3/MM3 (ref 0–0.4)
EOSINOPHIL NFR BLD MANUAL: 0 % (ref 0.3–6.2)
ERYTHROCYTE [DISTWIDTH] IN BLOOD BY AUTOMATED COUNT: 13 % (ref 12.3–15.4)
GLOBULIN UR ELPH-MCNC: 3.6 GM/DL
GLUCOSE SERPL-MCNC: 93 MG/DL (ref 65–99)
HAV IGM SERPL QL IA: NORMAL
HBV CORE IGM SERPL QL IA: NORMAL
HBV SURFACE AG SERPL QL IA: NORMAL
HCT VFR BLD AUTO: 39.9 % (ref 34–46.6)
HCV AB SER QL: NORMAL
HGB BLD-MCNC: 12.8 G/DL (ref 12–15.9)
HIV 1+2 AB+HIV1 P24 AG SERPL QL IA: NORMAL
LYMPHOCYTES # BLD MANUAL: 3.54 10*3/MM3 (ref 0.7–3.1)
LYMPHOCYTES NFR BLD MANUAL: 7.1 % (ref 5–12)
MCH RBC QN AUTO: 28.6 PG (ref 26.6–33)
MCHC RBC AUTO-ENTMCNC: 32.1 G/DL (ref 31.5–35.7)
MCV RBC AUTO: 89.3 FL (ref 79–97)
MICROCYTES BLD QL: ABNORMAL
MONOCYTES # BLD: 0.61 10*3/MM3 (ref 0.1–0.9)
N GONORRHOEA RRNA SPEC QL NAA+PROBE: NOT DETECTED
NEUTROPHILS # BLD AUTO: 4.24 10*3/MM3 (ref 1.7–7)
NEUTROPHILS NFR BLD MANUAL: 49.5 % (ref 42.7–76)
NRBC BLD AUTO-RTO: 0 /100 WBC (ref 0–0.2)
PLAT MORPH BLD: NORMAL
PLATELET # BLD AUTO: 236 10*3/MM3 (ref 140–450)
PMV BLD AUTO: 9.9 FL (ref 6–12)
POIKILOCYTOSIS BLD QL SMEAR: ABNORMAL
POTASSIUM SERPL-SCNC: 4.6 MMOL/L (ref 3.5–5.2)
PROT SERPL-MCNC: 7.3 G/DL (ref 6–8.5)
RBC # BLD AUTO: 4.47 10*6/MM3 (ref 3.77–5.28)
SODIUM SERPL-SCNC: 137 MMOL/L (ref 136–145)
T VAGINALIS DNA VAG QL NAA+PROBE: NOT DETECTED
TSH SERPL DL<=0.05 MIU/L-ACNC: 1.21 UIU/ML (ref 0.27–4.2)
VARIANT LYMPHS NFR BLD MANUAL: 41.4 % (ref 19.6–45.3)
WBC MORPH BLD: NORMAL
WBC NRBC COR # BLD AUTO: 8.56 10*3/MM3 (ref 3.4–10.8)

## 2025-07-24 PROCEDURE — G0432 EIA HIV-1/HIV-2 SCREEN: HCPCS

## 2025-07-24 PROCEDURE — 85007 BL SMEAR W/DIFF WBC COUNT: CPT

## 2025-07-24 PROCEDURE — 80074 ACUTE HEPATITIS PANEL: CPT

## 2025-07-24 PROCEDURE — 86695 HERPES SIMPLEX TYPE 1 TEST: CPT

## 2025-07-24 PROCEDURE — 86696 HERPES SIMPLEX TYPE 2 TEST: CPT

## 2025-07-24 PROCEDURE — 84443 ASSAY THYROID STIM HORMONE: CPT

## 2025-07-24 PROCEDURE — 81515 NFCT DS BV&VAGINITIS DNA ALG: CPT | Performed by: NURSE PRACTITIONER

## 2025-07-24 PROCEDURE — 80053 COMPREHEN METABOLIC PANEL: CPT

## 2025-07-24 PROCEDURE — 87491 CHLMYD TRACH DNA AMP PROBE: CPT | Performed by: NURSE PRACTITIONER

## 2025-07-24 PROCEDURE — 36415 COLL VENOUS BLD VENIPUNCTURE: CPT

## 2025-07-24 PROCEDURE — 99214 OFFICE O/P EST MOD 30 MIN: CPT | Performed by: NURSE PRACTITIONER

## 2025-07-24 PROCEDURE — 87591 N.GONORRHOEAE DNA AMP PROB: CPT | Performed by: NURSE PRACTITIONER

## 2025-07-24 PROCEDURE — 87185 SC STD ENZYME DETCJ PER NZM: CPT | Performed by: NURSE PRACTITIONER

## 2025-07-24 PROCEDURE — 85025 COMPLETE CBC W/AUTO DIFF WBC: CPT

## 2025-07-24 PROCEDURE — 87205 SMEAR GRAM STAIN: CPT | Performed by: NURSE PRACTITIONER

## 2025-07-24 PROCEDURE — 87070 CULTURE OTHR SPECIMN AEROBIC: CPT | Performed by: NURSE PRACTITIONER

## 2025-07-24 PROCEDURE — 86592 SYPHILIS TEST NON-TREP QUAL: CPT

## 2025-07-24 NOTE — PROGRESS NOTES
Chief Complaint  Exposure to STD (/)    Subjective            Zaria Santacruz presents to [facility]  History of Present Illness  Pt is here to get STD screening done. Pt would like a full panel. She has known exposure. Pt is not experiencing any symptoms. No other issues or concerns at this time.     Last intercourse was  2025, she has had multiple partners.    Pt was given number to self schedule for colon screening and will call to reschedule.             Past Medical History:   Diagnosis Date    Aftercare 2018    FOLLOWING EXCISION OF LEFT ANTEROLATERAL ANKLY CYST 2018    Aftercare 2018    FOLLOWING LEFT ANKLE CYST EXCISION    Mass of ankle 2018    Vein disorder        Allergies   Allergen Reactions    Hydrocodone GI Intolerance        Past Surgical History:   Procedure Laterality Date    BARIATRIC SURGERY  2013    CYST REMOVAL Right     ANKLE    GASTRIC BYPASS      HYSTERECTOMY  2015    PARTIAL     KNEE ACL RECONSTRUCTION Left         Social History     Tobacco Use    Smoking status: Former     Current packs/day: 0.00     Average packs/day: 0.5 packs/day for 17.0 years (8.5 ttl pk-yrs)     Types: Cigarettes     Start date:      Quit date:      Years since quittin.5     Passive exposure: Never    Smokeless tobacco: Never    Tobacco comments:     AGES [age]   Substance Use Topics    Alcohol use: Yes     Alcohol/week: 0.0 standard drinks of alcohol     Comment: OCCASIONALLY       Family History   Problem Relation Age of Onset    Stroke Father     Skin cancer Maternal Grandmother     Heart disease Maternal Grandfather     Diabetes Maternal Grandfather     Heart disease Paternal Grandfather     Colon cancer Neg Hx         Current Outpatient Medications on File Prior to Visit   Medication Sig    escitalopram (Lexapro) 20 MG tablet Take 1/2 tab (10mg) daily for 2 weeks then 1 tab 20mg daily (Patient taking differently:  "Take 1 tablet by mouth Daily. Take 1/2 tab (10mg) daily for 2 weeks then 1 tab 20mg daily)    naproxen (EC NAPROSYN) 500 MG EC tablet Take 1 tablet by mouth 2 (Two) Times a Day With Meals.    rizatriptan MLT (MAXALT-MLT) 10 MG disintegrating tablet Place 1 tablet on the tongue 1 (One) Time As Needed for Migraine. May repeat in 2 hours if needed     No current facility-administered medications on file prior to visit.       Health Maintenance Due   Topic Date Due    COLORECTAL CANCER SCREENING  Never done       Objective     /70   Pulse 86   Ht 165.1 cm (65\")   Wt 80.7 kg (178 lb)   SpO2 96%   BMI 29.62 kg/m²       Physical Exam  Exam conducted with a chaperone present.   Constitutional:       General: She is not in acute distress.     Appearance: Normal appearance. She is not ill-appearing.   HENT:      Head: Normocephalic and atraumatic.   Cardiovascular:      Rate and Rhythm: Normal rate and regular rhythm.      Heart sounds: Normal heart sounds. No murmur heard.  Pulmonary:      Effort: Pulmonary effort is normal. No respiratory distress.      Breath sounds: Normal breath sounds.   Chest:      Chest wall: No tenderness.   Genitourinary:     Vagina: Normal.      Cervix: Normal.      Uterus: Normal.       Rectum: Normal.      Comments: Swabs obtained without complication  Musculoskeletal:         General: No swelling or tenderness. Normal range of motion.      Cervical back: Normal range of motion and neck supple.   Skin:     General: Skin is warm and dry.      Findings: No rash.   Neurological:      General: No focal deficit present.      Mental Status: She is alert and oriented to person, place, and time. Mental status is at baseline.      Gait: Gait normal.   Psychiatric:         Mood and Affect: Mood normal.         Behavior: Behavior normal.         Thought Content: Thought content normal.         Judgment: Judgment normal.           Result Review :                           Assessment and Plan  "       Diagnoses and all orders for this visit:    1. STD exposure (Primary)  -     Gardnerella vaginalis, Trichomonas vaginalis, Candida albicans, DNA - Swab, Vagina; Future  -     Chlamydia trachomatis, Neisseria gonorrhoeae, PCR - , Vagina; Future  -     Genital Culture - Swab, Vagina; Future  -     HSV 1 & 2 - Specific Antibody, IgG; Future  -     HIV-1/O/2 Ag/Ab w Reflex; Future  -     RPR; Future  -     Hepatitis panel, acute; Future  -     Gardnerella vaginalis, Trichomonas vaginalis, Candida albicans, DNA - Swab, Vagina  -     Chlamydia trachomatis, Neisseria gonorrhoeae, PCR - , Vagina  -     Genital Culture - Swab, Vagina          I spent 30 minutes caring for Zaria on this date of service. This time includes time spent by me in the following activities:preparing for the visit, obtaining and/or reviewing a separately obtained history, performing a medically appropriate examination and/or evaluation , ordering medications, tests, or procedures, and documenting information in the medical record    Follow Up     Return if symptoms worsen or fail to improve.    Patient was given instructions and counseling regarding her condition or for health maintenance advice. Please see specific information pulled into the AVS if appropriate.     Zaria Santacruz  reports that she quit smoking about 12 years ago. Her smoking use included cigarettes. She started smoking about 29 years ago. She has a 8.5 pack-year smoking history. She has never been exposed to tobacco smoke. She has never used smokeless tobacco.

## 2025-07-25 LAB
HSV1 IGG SERPL QL IA: REACTIVE
HSV2 IGG SERPL QL IA: NON REACTIVE
RPR SER QL: NORMAL

## 2025-07-27 LAB
BACTERIA SPEC AEROBE CULT: ABNORMAL
BACTERIA SPEC AEROBE CULT: ABNORMAL
GRAM STN SPEC: ABNORMAL
GRAM STN SPEC: ABNORMAL